# Patient Record
Sex: FEMALE | Race: BLACK OR AFRICAN AMERICAN | NOT HISPANIC OR LATINO | Employment: FULL TIME | URBAN - METROPOLITAN AREA
[De-identification: names, ages, dates, MRNs, and addresses within clinical notes are randomized per-mention and may not be internally consistent; named-entity substitution may affect disease eponyms.]

---

## 2017-03-08 ENCOUNTER — RECORDS - HEALTHEAST (OUTPATIENT)
Dept: LAB | Facility: CLINIC | Age: 22
End: 2017-03-08

## 2017-03-08 LAB — HIV 1+2 AB+HIV1 P24 AG SERPL QL IA: NEGATIVE

## 2019-08-18 ENCOUNTER — HOSPITAL ENCOUNTER (OUTPATIENT)
Dept: OBGYN | Facility: CLINIC | Age: 24
Discharge: HOME | End: 2019-08-18
Attending: OBSTETRICS & GYNECOLOGY | Admitting: FAMILY MEDICINE

## 2019-08-19 LAB — FETAL RBC % LFV: 0 %

## 2019-09-19 ENCOUNTER — HOSPITAL ENCOUNTER (OUTPATIENT)
Dept: MEDSURG UNIT | Facility: CLINIC | Age: 24
Discharge: HOME | End: 2019-09-19
Attending: FAMILY MEDICINE | Admitting: FAMILY MEDICINE

## 2019-09-19 ENCOUNTER — COMMUNICATION - HEALTHEAST (OUTPATIENT)
Dept: OBGYN | Facility: CLINIC | Age: 24
End: 2019-09-19

## 2019-09-19 DIAGNOSIS — N30.00 ACUTE CYSTITIS WITHOUT HEMATURIA: ICD-10-CM

## 2019-09-19 LAB
ABO/RH(D): NORMAL
ALBUMIN UR-MCNC: ABNORMAL MG/DL
AMPHETAMINES UR QL SCN: NORMAL
ANTIBODY SCREEN: NEGATIVE
APPEARANCE UR: ABNORMAL
BACTERIA #/AREA URNS HPF: ABNORMAL HPF
BARBITURATES UR QL: NORMAL
BENZODIAZ UR QL: NORMAL
BILIRUB UR QL STRIP: NEGATIVE
CANNABINOIDS UR QL SCN: NORMAL
CLUE CELLS: NORMAL
COCAINE UR QL: NORMAL
COLOR UR AUTO: YELLOW
CREAT UR-MCNC: 78.4 MG/DL
ERYTHROCYTE [DISTWIDTH] IN BLOOD BY AUTOMATED COUNT: 13.7 % (ref 11–14.5)
ETHANOL UR CFM-MCNC: <10 MG/DL
GLUCOSE UR STRIP-MCNC: NEGATIVE MG/DL
HBV SURFACE AG SERPL QL IA: NEGATIVE
HCT VFR BLD AUTO: 32.5 % (ref 35–47)
HGB BLD-MCNC: 10.4 G/DL (ref 12–16)
HGB UR QL STRIP: NEGATIVE
HIV 1+2 AB+HIV1 P24 AG SERPL QL IA: NEGATIVE
KETONES UR STRIP-MCNC: ABNORMAL MG/DL
LEUKOCYTE ESTERASE UR QL STRIP: ABNORMAL
MCH RBC QN AUTO: 27.1 PG (ref 27–34)
MCHC RBC AUTO-ENTMCNC: 32 G/DL (ref 32–36)
MCV RBC AUTO: 85 FL (ref 80–100)
METHADONE UR QL SCN: NORMAL
MUCOUS THREADS #/AREA URNS LPF: ABNORMAL LPF
NITRATE UR QL: POSITIVE
OPIATES UR QL SCN: NORMAL
OXYCODONE UR QL: NORMAL
PCP UR QL SCN: NORMAL
PH UR STRIP: 7 [PH] (ref 4.5–8)
PLATELET # BLD AUTO: 336 THOU/UL (ref 140–440)
PMV BLD AUTO: 9.7 FL (ref 8.5–12.5)
RBC # BLD AUTO: 3.84 MILL/UL (ref 3.8–5.4)
RBC #/AREA URNS AUTO: ABNORMAL HPF
SP GR UR STRIP: 1 (ref 1–1.03)
SQUAMOUS #/AREA URNS AUTO: ABNORMAL LPF
TRICHOMONAS, WET PREP: NORMAL
UROBILINOGEN UR STRIP-ACNC: ABNORMAL
WBC #/AREA URNS AUTO: ABNORMAL HPF
WBC: 7.9 THOU/UL (ref 4–11)
YEAST, WET PREP: NORMAL

## 2019-09-20 LAB
RUBV IGG SERPL QL IA: POSITIVE
T PALLIDUM AB SER QL: NEGATIVE

## 2019-09-22 LAB — BACTERIA SPEC CULT: ABNORMAL

## 2019-10-10 ENCOUNTER — TRANSFERRED RECORDS (OUTPATIENT)
Dept: HEALTH INFORMATION MANAGEMENT | Facility: CLINIC | Age: 24
End: 2019-10-10

## 2019-10-29 ENCOUNTER — TELEPHONE (OUTPATIENT)
Dept: FAMILY MEDICINE | Facility: CLINIC | Age: 24
End: 2019-10-29

## 2019-10-29 NOTE — TELEPHONE ENCOUNTER
[facility] phone call message- general phone call:    Reason for call: Patient is calling for OB care, she states she is being seen at the [facility] but wants to transfer over to us because she doesn't want to deliver at [facility] and that they kept messing up with her appts. She states she is 31 weeks pregnant. Told her it could take up to two business days for a response. Please call and advise.     Return call needed: Yes    OK to leave a message on voice mail?     Primary language: English      needed? No    Call taken on October 29, 2019 at 3:28 PM by Cheko Marie

## 2019-10-31 NOTE — TELEPHONE ENCOUNTER
Carolina is a 23yo, english speaking female who wishes to transfer care to [facility] r/t wanting to deliver at [facility] rather than [facility]. Patient endorses negative experience at [facility] r/t pre-eclampsia and hemorrhage. Carolina and father of baby, Romero, are excited for this pregnancy. Romero is not FOB to previous. Carolina endorses hemorrhage during  in 2016, previous delivery uncomplicated.     Carolina has been without insurance and has had most care at the hospital. Carolina was recently diagnosed with a  UTI and did not  her antibiotics, stated hospital never sent them to pharmacy. Requested Carolina call hospital and have them resent and to take immediately r/t symptomatic. Carolina verbalized understanding.     Patient  previous  but stated she stopped at 5 months due to low milk supply. Asked patient if she had tried arroyo expression, patient stated she hadn't. Patient endorses lack of education on lactation. Advised we can assist with education for her, patient appreciated and verbalized understanding. Patient wants to breastfeed  for one year.     Carolina is currently on medical leave, requesting  complete FMLA paperwork at visit. Carolina wishes to see a female provider and will see . TARI:2019 per patient. DWAINE to be signed at visit.     Average Risk Category  No significant risk factors: Yes    At Risk Category (up to 3)  Teen pregnancy: No  Poor social situation: No  Domestic abuse: No  Financial difficulties: No  Smoker: No  H/O  deliver: No  H/O drug abuse: No  Non-English speaking: No  Advanced maternal age: No  GDM risks: No  Previous C/S: No  H/O PIH: No  H/O STIs: No  H/O mental health concerns: No  Onset care > 20 weeks: Yes  Other: Hx of hypertension during pregnancy, pre eclampsia    High Risk Category (4 or more At Risk or)  Diabetes/GDM: No  Multiple gestation: No  Chronic hypertension: Yes  Significant hx of asthma: No  Fetal  demise > 20 weeks: No  Positive tox screen: No  Current mental health treatment: No  Other: pre eclampsia during previous delivery    Risk: High Risk   Date determined: 10/31/2019    Tiny JOHNSON

## 2019-11-03 NOTE — PROGRESS NOTES
Carolina Mcpherson is a 24 year old  female who presents to clinic for a new OB visit.  Her last menstrual period was 3/14/19, estimate     Estimated Date of Delivery: TARI of 19 based on US at 5w, consistent with LMP    She has not had bleeding since her LMP. She has not had any abdominal pain or cramping since her LMP. She has not had nausea. Weigh loss has not occurred.     OTHER CONCERNS:    - Was seen at [facility] due to insurance issues prior to presenting to our clinic   - Wants to transfer care as she does not want to delivery at [facility]   - Was seen at [facility] maternity triage on 10/21 due to contractions and dysuria. Infant status reassuring, fetal fibronectin negative. Gonorrhea and chlamydia negative. UC positive for >100,000 E coli pan sensitive. Still has not picked up antibiotic for treatment, states it was not at the pharmacy. Continues to have contractions every 10 minutes, is able to breathe through them. No fevers, chills, dysuria, but is having frequency and urgency. Continues to feel baby move and denies any bleeding or vaginal discharge   - Has been seen at numerous hospitals at [facility] monthly for triages since the onset of her pregnancy.   - History of  in 2016 with postpartum hemorrhage and preeclampsia, reports possibility of gestational hypertension, but does not remember if she was on any medications   - First pregnancy with  delivery around 35-36w, no other issues   - Reports her pre-pregnancy weight of 190lbs, has gained 37lbs thus far in pregnancy     Pre Term Labor Risk Assessment  Is the patient's age <18 or >40? No  Patint's BMI is Body mass index is 37.77 kg/m .. Does patient have a BMI < 18.5? No  If previous pregnancy, was delivery within previous 6 months? No  Have you ever delivered a baby prior to 37 weeks gestation? No  Did conception for this pregnancy occur via In Vitro Fertilization? No  Summary: Patient is not high risk for  Labor for   labor.    Patient Active Problem List   Diagnosis   (none) - all problems resolved or deleted       OB History    Para Term  AB Living   3 2 1 1 0 2   SAB TAB Ectopic Multiple Live Births   0 0 0 0 2      # Outcome Date GA Lbr Ghulam/2nd Weight Sex Delivery Anes PTL Lv   3 Current            2 Term 16      None  MICHELE      Complications: Hemorrhage, Preeclampsia/Hypertension   1  14   3.515 kg (7 lb 12 oz) M  EPI N MICHELE     History of preeclampsia and post-partum hemorrhage with last pregnancy. Possible history of gestational hypertension, though unclear     No past surgical history     Prenatal vitamin      Do you have a history of any of the following medical conditions?  Condition Yes/No   Hypertension YES   Heart disease, mitral valve prolapse, rheumatic fever No   Asthma or another chronic lung disease No   An autoimmune disorder No   Kidney disease No   Frequent urinary tract infections No   Migraine headaches No   Stroke, loss of sensation/function, seizures, or other neuro problem No   Diabetes No   Thyroid problems or have you taken thyroid medication No   Hepatitis, liver disease, jaundice No   Blood clots, phlebitis, pulmonary embolism or varicose veins No   Excessive bleeding after surgery or dental work No   Heavy menstrual periods requiring treatment No   Anemia No   Blood transfusions No        Would you refuse a blood transfusion? No   Breast problems No   Abnormalities of the uterus No   Abnormal pap smear No   Have you been treated for an emotional disturbance? No   Have you been physically, sexually, or emotionally hurt by someone? No   Have you been in a major accident or suffered serious trauma? No   Have you had surgical procedures? No        Have you ever had any complications from anesthesia? No   Have you ever been hospitalized for a nonsurgical reason? No     Notes for positives:     History of possible gestational hypertension with last pregnancy,  though history unclear     Prenatal Genetic Screening  Do you have a history of any of the following Yes/No        A metabolic disorder (e.g. Insulin-dependent DM, PKU) No        Recurrent pregnancy loss No     Do you, the baby's father, or anyone in your families have Yes/No        Thalassemia AND MCV <80 No        Hemophilia No        Neural tube defect No        Congenital heart defect No        Sickle cell disease or trait No        Muscular dystrophy No        Cystic fibrosis No        Mental retardation or autism No        Down's syndrome No        Howie-Sach's disease No        Yakima's chorea No        Any other inherited genetic or chromosomal disorder No        A child with birth defects not listed above No     Infection History  At high risk for coming in contact with HIV No   Ever treated for tuberculosis No   Ever received the BCG vaccine for tuberculosis No   Ever had genital herpes (or has your partner) No   Had a rash or viral illness since LMP No   Ever had a sexually transmitted infection No   Ever had chicken pox or the vaccine Yes   Ever had any other serious infectious disease No   Up to date with immunizations Yes   STI History YES - chlamydia, most recent on 10/21 negative        PERSONAL/SOCIAL HISTORY  Social History     Tobacco Use     Smoking status: Never Smoker     Smokeless tobacco: Never Used   Substance Use Topics     Alcohol use: Never     Frequency: Never     Drug use: None      Have you used any tobacco products, alcohol or any other drugs during this pregnancy before or after your knew you were pregnant? No    REVIEW OF SYSTEMS  C: NEGATIVE for fever, chills  E: NEGATIVE for vision changes or irritation  ENT: NEGATIVE for ear, mouth and throat problems  R: NEGATIVE for significant cough or SOB  B: NEGATIVE for masses, tenderness or discharge  CV: NEGATIVE for chest pain, palpitations or peripheral edema  GI: NEGATIVE for nausea, abdominal pain, heartburn, or change in bowel  "habits  : NEGATIVE for unusual urinary or vaginal symptoms.   M: NEGATIVE for significant arthralgias or myalgia  N: NEGATIVE for weakness, dizziness or paresthesias  P: NEGATIVE for changes in mood or affect    PHYSICAL EXAM:  /81   Pulse 107   Temp 97.6  F (36.4  C) (Oral)   Resp 18   Ht 1.651 m (5' 5\")   Wt 103 kg (227 lb)   LMP 03/14/2019 (Approximate)   SpO2 99%   BMI 37.77 kg/m     GENERAL:  Pleasant pregnant female, obese, alert, well groomed.  SKIN:  Warm and dry, without lesions or rashes  EYES:  PERRLA, EOM intact  NECK:  Thyroid without enlargement and nodules.   LUNGS:  Clear to auscultation.  HEART:  RRR without murmur.  ABDOMEN: Soft without masses, tenderness or organomegaly. No CVA tenderness. Fundus at 37cm. FHT 145bpm  MUSCULOSKELETAL:  Full range of motion. Normal gait   EXTREMITIES:  No edema. No significant varicosities.   GENITALIA:  Normal appearing anatomy, no lesions noted.  CERVIX: 0.5/10/-4 with no abnormal vaginal discharge     ASSESSMENT/PLAN  Patient Active Problem List   Diagnosis     History of postpartum hemorrhage, currently pregnant in third trimester     History of pre-eclampsia in prior pregnancy, currently pregnant in third trimester     Excess weight gain in pregnancy, third trimester     Supervision of high risk pregnancy in third trimester     - Patient has had some prenatal labs through recent OB triage visits at various hospitals, and likely more through [facility]. Reports she had her 1 hour GTT with normal results. Has gotten her TDAP. Denies any US after dating US   - Will obtain records from [facility] and see what additional labs we need to catch her up on prenatal care   - BP slightly above normal, but will continue close monitoring   - Fetal anatomy US ordered   - Augmentin 5 day course sent for treatment of UTI, despite contractions every 10 minutes for 2 weeks cervix is still closed, high and posterior. Recommended she  the " medication tonight. Discussed indications to present to [facility] [place] and number given   - Would like genetic screening, which at this point would be cell free DNA, discussed risk of false positives, no specific risk factors for genetic abnormalities. Referral placed for OB to have this done   - Discussed excessive weight gain thus far in pregnancy and need to not overeat, ideally would maintain this weight until delivery, likely why she is measuring large for gestation, but will obtain fetal US to evaluate this as well   - Influenza vaccine given today   -Safe medications during pregnancy. Is currently taking prenatal vitamin daily.   -Healthy habits including not using tobacco or alcohol, exercising regularly and maintaining healthy diet  -Recommendations for breastfeeding.    Will follow up in 1 week for routine pre- care.    Bhavya Bravo DO (PGY3)  [facility] Resident  Pager: [phone]      Precepted with: Luz Maria Fuentes MD

## 2019-11-04 ENCOUNTER — OFFICE VISIT (OUTPATIENT)
Dept: FAMILY MEDICINE | Facility: CLINIC | Age: 24
End: 2019-11-04
Payer: MEDICAID

## 2019-11-04 VITALS
TEMPERATURE: 97.6 F | DIASTOLIC BLOOD PRESSURE: 81 MMHG | RESPIRATION RATE: 18 BRPM | WEIGHT: 227 LBS | BODY MASS INDEX: 37.82 KG/M2 | HEIGHT: 65 IN | OXYGEN SATURATION: 99 % | HEART RATE: 107 BPM | SYSTOLIC BLOOD PRESSURE: 134 MMHG

## 2019-11-04 DIAGNOSIS — O26.03 EXCESS WEIGHT GAIN IN PREGNANCY, THIRD TRIMESTER: ICD-10-CM

## 2019-11-04 DIAGNOSIS — Z23 NEED FOR PROPHYLACTIC VACCINATION AND INOCULATION AGAINST INFLUENZA: ICD-10-CM

## 2019-11-04 DIAGNOSIS — O23.43 URINARY TRACT INFECTION IN MOTHER DURING THIRD TRIMESTER OF PREGNANCY: Primary | ICD-10-CM

## 2019-11-04 DIAGNOSIS — O09.293 HISTORY OF POSTPARTUM HEMORRHAGE, CURRENTLY PREGNANT IN THIRD TRIMESTER: ICD-10-CM

## 2019-11-04 DIAGNOSIS — O09.293 HISTORY OF PRE-ECLAMPSIA IN PRIOR PREGNANCY, CURRENTLY PREGNANT IN THIRD TRIMESTER: ICD-10-CM

## 2019-11-04 DIAGNOSIS — O09.93 SUPERVISION OF HIGH RISK PREGNANCY IN THIRD TRIMESTER: ICD-10-CM

## 2019-11-04 RX ORDER — PRENATAL VIT/IRON FUM/FOLIC AC 27MG-0.8MG
1 TABLET ORAL DAILY
COMMUNITY

## 2019-11-04 SDOH — HEALTH STABILITY: MENTAL HEALTH: HOW OFTEN DO YOU HAVE A DRINK CONTAINING ALCOHOL?: NEVER

## 2019-11-04 ASSESSMENT — MIFFLIN-ST. JEOR: SCORE: 1780.55

## 2019-11-04 NOTE — PATIENT INSTRUCTIONS
[facility] Information  Your resident OB Doctor is Dr. Bhavya Bravo.    If you have any further concerns or wish to schedule another appointment, please call our office at [phone] during normal business hours (8-5, M-F).     For uncomplicated pregnancies, you will be seeing your doctor once a month until you are 28 weeks, then you will see your doctor twice a month. You will begin weekly visits at 36 weeks until you deliver.     If you have urgent medical questions that cannot wait, you may call [phone] at any time of day.     If you have a medical emergency, please call [phone].     When to call or come in to [facility] ([phone] for Labor & Delivery unit)  If you notice a decrease in your baby's movement.   If your begin to experience contractions that are 5 minutes or less apart and lasting for over 45 seconds, or if contractions are becoming more painful.  If you have any bleeding or leakage of fluids.   If you have a headache not resolved with tylenol, right upper abdominal pain, or sudden onset of swelling.  You know your body best. Never hesitate to call or go to the hospital if something doesn't feel right!    After-baby Birth Control Methods   It is important to consider contraception after your baby is born if you would like to prevent a pregnancy in the near future. If you are breast feeding, talk with your doctor to determine the best method for you. It is recommended that you wait 12 months after the birth of your baby to get pregnant again.   Natural Family Planning  It is possible to avoid pregnancy or time them based on your family goals without any hormones or medications or need for condoms. Talk with Dr. Gomez about the many options that are available to track your menstrual cycle and identify days which you are fertile and not fertile. The Sympto-thermal method is as effective as birth control pills.  Condoms   Latex condoms can prevent pregnancy and STDs. Condoms  work best when used with spermicide that is placed inside the vagina as well as inside the condom. Use only water-based lubricants. Petroleum based products (such as Vaseline and many massage oils) can weaken the latex and cause it to break.   IUD   IUD's are made of flexible plastic and must be inserted into your uterus by a doctor. The IUD works by stopping the fertilized egg from attaching itself to the uterus. IUDs may increase the risk of getting a pelvic infection (PID), which can lead to infertility if not diagnosed and treated early. This is a great option after delivery of your baby! These last for 3, 5, or 10 years depending up on which type you choose, but can be removed earlier if you decide you would like to get pregnant sooner.    Tubal Ligation & Vasectomy   These are good choices for women and men who know that they do not want to have any more children.     HORMONES   Birth control pills, hormone implants and shots work by stopping ovulation (release of the egg from the ovary). Implants are placed in the upper arm by a minor surgical incision. They last for five years, but can be removed by your doctor if you decide to get pregnant. Hormone injections must be repeated every three months. The Pill must be taken every day.   All hormones can have side effects and create certain health risks. They are very effective in preventing pregnancy but they do not prevent STDs. You can talk more about the risks and benefits with your doctor.     Controlling Back Pain  As your body changes during pregnancy, your back must work in new ways. Back pain is due to many causes. Physical changes in your body can strain your back and its supporting muscles. Also, hormones (chemicals that carry messages throughout the body) increase during pregnancy. This can affect how the muscles and joints work together. All of these changes can lead to pain in the upper or lower back or pelvis. Some pregnant women have sciatica, pain  caused by pressure on the sciatic nerve running down the back of the leg. Ask your healthcare provider for specific tips and exercises to help control your back pain.    Tips to Help You Rest  Good rest and sleep will help you feel better. Here are some ideas:  Ask your partner to massage your shoulders, neck, or back.  Limit the errands you do each day.  Lie down in the afternoon or after work for a few minutes.  Take a warm bath before you go to sleep.  Drink warm milk or teas without caffeine.  Avoid coffee, black tea, and cola.    Preventing Heartburn  Avoid spicy or acidic foods.   Eat small amounts more often.  Wait 2 hours after eating before lying down   Sleep with your upper body raised 6 inches.  May use Tums as needed. Talk to your doctor about other medications to try.     Referral for :     OB/GYN   LOCATION/PLACE/Provider :    [facility]   DATE & TIME :    Faxed referral, location will call   PHONE :     [phone]  FAX :    [phone]  Appointment made by clinic staff/:    Delilah

## 2019-11-05 ENCOUNTER — NURSE TRIAGE (OUTPATIENT)
Dept: NURSING | Facility: CLINIC | Age: 24
End: 2019-11-05

## 2019-11-05 ENCOUNTER — COMMUNICATION - HEALTHEAST (OUTPATIENT)
Dept: SCHEDULING | Facility: CLINIC | Age: 24
End: 2019-11-05

## 2019-11-06 ENCOUNTER — HOSPITAL ENCOUNTER (OUTPATIENT)
Dept: OBGYN | Facility: HOSPITAL | Age: 24
Discharge: HOME | End: 2019-11-07
Attending: FAMILY MEDICINE | Admitting: FAMILY MEDICINE

## 2019-11-06 LAB
ALBUMIN UR-MCNC: ABNORMAL MG/DL
APPEARANCE UR: CLEAR
BACTERIA #/AREA URNS HPF: ABNORMAL HPF
BILIRUB UR QL STRIP: NEGATIVE
CLUE CELLS: NORMAL
COLOR UR AUTO: YELLOW
GLUCOSE UR STRIP-MCNC: NEGATIVE MG/DL
HGB UR QL STRIP: NEGATIVE
KETONES UR STRIP-MCNC: ABNORMAL MG/DL
LEUKOCYTE ESTERASE UR QL STRIP: ABNORMAL
MUCOUS THREADS #/AREA URNS LPF: ABNORMAL LPF
NITRATE UR QL: NEGATIVE
PH UR STRIP: 6 [PH] (ref 4.5–8)
RBC #/AREA URNS AUTO: ABNORMAL HPF
SP GR UR STRIP: 1.02 (ref 1–1.03)
SQUAMOUS #/AREA URNS AUTO: ABNORMAL LPF
TRICHOMONAS, WET PREP: NORMAL
UROBILINOGEN UR STRIP-ACNC: ABNORMAL
WBC #/AREA URNS AUTO: ABNORMAL HPF
YEAST, WET PREP: NORMAL

## 2019-11-06 ASSESSMENT — MIFFLIN-ST. JEOR: SCORE: 1770.55

## 2019-11-06 NOTE — TELEPHONE ENCOUNTER
"\"I am 32 weeks(3rd pregnancy) and since yesterday  I have been having contractions 10 minutes apart. I know the difference between [place] and labor. These are not going away. Tonight I am also have vaginal pressure and upper leg pain and back labor.\" Baby is active. Denies leakage of fluids or other sx. Triaged and advised ER.  Ginny Arreaga RN [facility]        Reason for Disposition    Contractions < 10 minutes apart for 1 hour (i.e., 6 or more contractions an hour)    Additional Information    Negative: Passed out (i.e., lost consciousness, collapsed and was not responding)    Negative: Shock suspected (e.g., cold/pale/clammy skin, too weak to stand, low BP, rapid pulse)    Negative: Difficult to awaken or acting confused (e.g., disoriented, slurred speech)    Negative: [1] SEVERE abdominal pain (e.g., excruciating) AND [2] constant AND [3] present > 1 hour    Negative: Severe bleeding (e.g., continuous red blood from vagina, or large blood clots)    Negative: Umbilical cord hanging out of the vagina (shiny, white, curled appearance, \"like telephone cord\")    Negative: Uncontrollable urge to push (i.e., feels like baby is coming out now)    Negative: Can see baby    Negative: Sounds like a life-threatening emergency to the triager    Negative: MODERATE-SEVERE abdominal pain    Protocols used: PREGNANCY - LABOR - [id number]      "

## 2019-11-07 DIAGNOSIS — O09.93 SUPERVISION OF HIGH RISK PREGNANCY IN THIRD TRIMESTER: ICD-10-CM

## 2019-11-07 LAB
AMPHETAMINES UR QL SCN: NORMAL
BARBITURATES UR QL: NORMAL
BENZODIAZ UR QL: NORMAL
CANNABINOIDS UR QL SCN: NORMAL
COCAINE UR QL: NORMAL
CREAT UR-MCNC: 141.6 MG/DL
OPIATES UR QL SCN: NORMAL
OXYCODONE UR QL: NORMAL
PCP UR QL SCN: NORMAL

## 2019-11-07 NOTE — PROGRESS NOTES
Preceptor Attestation:  Patient's case reviewed and discussed with  Patient seen and discussed with the resident..  I agree with written assessment and plan of care.  Supervising Physician:  Gina Fuentes MD  [facility]

## 2019-11-07 NOTE — LETTER
November 8, 2019      Carolina Mcpherson  [address] [facility] [address]        Dear Carolina,    It was a pleasure to see you at clinic. The results of your ultrasound are normal. Baby is growing at a normal rate.     Please call our clinic with any questions. We look forward to seeing you again.         If you have any questions, please call the clinic to make an appointment.    Sincerely,    Dr. Bravo

## 2019-11-08 LAB
ALLERGIC TO PENICILLIN: NO
BACTERIA SPEC CULT: NO GROWTH
GP B STREP DNA SPEC QL NAA+PROBE: NEGATIVE

## 2019-11-10 NOTE — PROGRESS NOTES
"Carolina Mcpherson is a 24 year old  female who presents to clinic for a follow up OB visit. She is currently 32w6d with an estimated date of delivery of Dec 30, 2019 via LMP, consistent with US at 5w. She denies headache, chest pain, shortness of breath, abdominal pain/contractions, vaginal bleeding, or abnormal discharge. She has felt baby move.     New concerns today:   Genetic screening- still waiting on results    Seen at triage for contractions. Cervix unchanged since clinic or during 2 hour observation. Was given 1L bolus and recommended to finish course of antibiotics.   Contractions last night which only lasted a minute    On review of records from prior pregnancies:   - With last pregnancy on  patient had no prenatal care, presented in active labor at 40w1d. . She did have intrapartum elevated BPs, but no preeclampsia and no postpartum hemorrhage.   - With other prior pregnancy delivered at 37w2d. No prenatal complications, though was late to prenatal care     US normal with normal growth, but difficult to assess cisterna magna given gestation age. EFW at 40th percentile based on LMP      OB History    Para Term  AB Living   3 2 1 1 0 2   SAB TAB Ectopic Multiple Live Births   0 0 0 0 2      # Outcome Date GA Lbr Ghulam/2nd Weight Sex Delivery Anes PTL Lv   3 Current            2 Term 16      None  MICHELE      Complications: Hemorrhage, Preeclampsia/Hypertension   1  14   3.515 kg (7 lb 12 oz) M  EPI N MICHELE     Physical exam:  BP 99/68   Pulse 102   Temp 97.7  F (36.5  C) (Oral)   Resp 16   Ht 1.662 m (5' 5.43\")   Wt 103.6 kg (228 lb 6.4 oz)   LMP 2019 (Approximate)   SpO2 96%   BMI 37.51 kg/m      General: alert, female in no acute distress  Abdomen: gravid uterus appropriate for gestation age at 37 cm above pubic symphysis, non-tender, FHTs 135  Extremities: no edema    Assessment/Plan:  Patient Active Problem List   Diagnosis     History of " postpartum hemorrhage, currently pregnant in third trimester     History of pre-eclampsia in prior pregnancy, currently pregnant in third trimester     Excess weight gain in pregnancy, third trimester     Supervision of high risk pregnancy in third trimester     Prenatal complications   - Excessive weight gain in pregnancy     Patient plans to breastfeed.   Post-partum contraception plans: unsure, but she does not want any more children, tried depo and nexplanon and did not like these,  would never get a vasectomy   Reviewed  care and baby-friendly practices done at hospital after delivery.  Baby BOY - would like circumcision     Follow up in 2 weeks for routine prenatal visit.     Bhavya Bravo DO (PGY3)  [facility] Resident  Pager: [phone]      Precepted with: Julio Mendez MD

## 2019-11-11 ENCOUNTER — OFFICE VISIT (OUTPATIENT)
Dept: FAMILY MEDICINE | Facility: CLINIC | Age: 24
End: 2019-11-11
Payer: MEDICAID

## 2019-11-11 ENCOUNTER — DOCUMENTATION ONLY (OUTPATIENT)
Dept: FAMILY MEDICINE | Facility: CLINIC | Age: 24
End: 2019-11-11

## 2019-11-11 VITALS
HEIGHT: 65 IN | RESPIRATION RATE: 16 BRPM | HEART RATE: 102 BPM | BODY MASS INDEX: 38.05 KG/M2 | OXYGEN SATURATION: 96 % | TEMPERATURE: 97.7 F | WEIGHT: 228.4 LBS | DIASTOLIC BLOOD PRESSURE: 68 MMHG | SYSTOLIC BLOOD PRESSURE: 99 MMHG

## 2019-11-11 DIAGNOSIS — O26.03 EXCESS WEIGHT GAIN IN PREGNANCY, THIRD TRIMESTER: ICD-10-CM

## 2019-11-11 DIAGNOSIS — Z34.83 ENCOUNTER FOR SUPERVISION OF OTHER NORMAL PREGNANCY, THIRD TRIMESTER: Primary | ICD-10-CM

## 2019-11-11 ASSESSMENT — MIFFLIN-ST. JEOR: SCORE: 1793.77

## 2019-11-11 NOTE — LETTER
RETURN TO WORK/SCHOOL FORM    11/11/2019    Re: Carolina Mcpherson  1995      To Whom It May Concern:     Carolina Mcpherson was seen in clinic today.  She may return to work with restrictions on 11/12/19          Restrictions: Limited to light duty - lifting no greater than 30 pounds          Bhavya Bravo,   11/11/2019 2:50 PM

## 2019-11-11 NOTE — PROGRESS NOTES
Faxed DWAINE to [facility] at [phone] after verifying with medical records patient was seen there and verified fax number. Tiny JOHNSON

## 2019-11-18 ENCOUNTER — TELEPHONE (OUTPATIENT)
Dept: FAMILY MEDICINE | Facility: CLINIC | Age: 24
End: 2019-11-18

## 2019-11-18 PROBLEM — O60.00 PRETERM LABOR WITHOUT DELIVERY: Status: ACTIVE | Noted: 2019-11-18

## 2019-11-18 PROBLEM — Z34.83 ENCOUNTER FOR SUPERVISION OF OTHER NORMAL PREGNANCY, THIRD TRIMESTER: Status: ACTIVE | Noted: 2019-11-04

## 2019-11-18 NOTE — TELEPHONE ENCOUNTER
Called patient to schedule HFU with colored team for this week. No answer, left VM to call clinic back. Tiny JOHNSON

## 2019-11-19 ENCOUNTER — TELEPHONE (OUTPATIENT)
Dept: FAMILY MEDICINE | Facility: CLINIC | Age: 24
End: 2019-11-19

## 2019-11-19 NOTE — TELEPHONE ENCOUNTER
Seen in Maternity 2019 for  labor, given Magnesium. Had experienced diarrhea while in hospital, was informed side effect from medication. Continues to have diarrhea, average twice a day since , 2019. C/o weakness, dizziness and experiencing an increase in contractions. Good fetal movement. Has been trying to push a good amount of fluid intake. With  history and continued worsening contractions, advised to go back into maternity for further assessment and intervention, only 34w 1d gestation. Tanya JOHNSON

## 2019-11-19 NOTE — PROGRESS NOTES
Preceptor Attestation:   Patient seen, evaluated and discussed with the resident. I have verified the content of the note, which accurately reflects my assessment of the patient and the plan of care.    Supervising Physician:Julio Mendez MD    [facility]

## 2019-11-21 ENCOUNTER — OFFICE VISIT (OUTPATIENT)
Dept: FAMILY MEDICINE | Facility: CLINIC | Age: 24
End: 2019-11-21
Payer: MEDICAID

## 2019-11-21 VITALS
HEART RATE: 111 BPM | WEIGHT: 232 LBS | TEMPERATURE: 98.3 F | DIASTOLIC BLOOD PRESSURE: 68 MMHG | OXYGEN SATURATION: 98 % | RESPIRATION RATE: 20 BRPM | SYSTOLIC BLOOD PRESSURE: 103 MMHG | BODY MASS INDEX: 38.1 KG/M2

## 2019-11-21 DIAGNOSIS — Z34.83 ENCOUNTER FOR SUPERVISION OF OTHER NORMAL PREGNANCY, THIRD TRIMESTER: Primary | ICD-10-CM

## 2019-11-21 NOTE — PROGRESS NOTES
History   Hospital follow up: Per chart review, was admitted at [facility] 19 for 3 days. Notes reviewed. Was having  labor with contractions every 3-5m but no change in cervix. This stopped so was stable for discharge.     Then seen again in triage  by Dr. Bravo.     Then again last night at [place] triage.     Since her discharge she has been very anxious. Has had intermittent diarrhea. Plans to deliver at [facility].     Carolina Mcpherson is a 24 year old  female who presents to clinic for a follow up OB visit.   - 34w3d with TARI Dec 30, 2019  - No headache, chest pain, shortness of breath, vaginal bleeding, or abnormal discharge. has been feeling baby move every day.   - Circumcision plan: if boy, DOES desire circumcision  - Breastfeeding plan: prior breastfeeding experience and DOES plan to breastfeed  - Contraception plan: nexplanon at 6 weeks  - Birth plan: no pain meds  - Prior delivery complications: Maybe HTN in second pregnancy?  - Carseat: has carseat less than 5 years old  - Pediatrician plans: this clinic, with Dr. Bravo.     Was just seen at [place] overnight  She went there because she thought it was closer  She had some diarrhea   Cervical exam at that time was /3      Medical and social history and medications reviewed with patient.   Exam   /68   Pulse 111   Temp 98.3  F (36.8  C) (Oral)   Resp 20   Wt 105.2 kg (232 lb)   LMP 2019 (Approximate)   SpO2 98%   BMI 38.10 kg/m    General: NAD  Abdomen: gravid uterus appropriate for gestation age at 34 cm above pubic symphysis, non-tender, FHTs 130s  Extremities: no edema  Neuro: reflexes normal  Medical Decision-Making     Patient Active Problem List   Diagnosis     Excess weight gain in pregnancy, third trimester     Encounter for supervision of other normal pregnancy, third trimester      labor without delivery       Follow up: 19 (4d) for routine prenatal visit  Readdress: Anxiety    Walter  PEDRO Owens MD   [facility]  Pager #: [phone]      Precepted with Dr. Tolentino    Options for treatment and follow-up care were reviewed with the patient and/or guardian. Carolina FARNSWORTH Mcpherson and/or guardian engaged in the decision making process and verbalized understanding of the options discussed and agreed with the final plan.

## 2019-11-21 NOTE — Clinical Note
ARMANI Latif, follow up w/ you in 4d. Very anxious. Was at [facility] early this AM. Tried to reassure her. Note looks weird franki I thought routine OB but it was actually a TCM. JVM

## 2019-11-24 NOTE — PROGRESS NOTES
"Carolina Mcpherson is a 24 year old  female who presents to clinic for a follow up OB visit. She is currently 34w6d with an estimated date of delivery of Dec 30, 2019 via via LMP, consistent with US at 5w. She denies headache, chest pain, shortness of breath, abdominal pain, vaginal bleeding, or abnormal discharge. She has felt baby move.     New concerns today:   Contractions: Not as bad and not as frequent as before, every 20 min, lasting 1 minute. Mostly happen at night, not during the day.   Genetic screening results: No concerns   Weight: Same from last visit. Congratulated on weight maintenance and encouraged to continue     OB History    Para Term  AB Living   3 2 2 0 0 2   SAB TAB Ectopic Multiple Live Births   0 0 0 0 2      # Outcome Date GA Lbr Ghulam/2nd Weight Sex Delivery Anes PTL Lv   3 Current            2 Term 16 40w1d     None  MICHELE   1 Term 14 37w2d  3.515 kg (7 lb 12 oz) M  EPI N MICHELE      Obstetric Comments   Late to prenatal care with first pregnancy. No prenatal care with second pregnancy        Physical exam:  BP 93/64   Pulse 88   Temp 97.3  F (36.3  C)   Resp 22   Ht 1.651 m (5' 5\")   Wt 105.3 kg (232 lb 3.2 oz)   LMP 2019 (Approximate)   SpO2 99%   BMI 38.64 kg/m      General: alert, female in no acute distress  Abdomen: gravid uterus appropriate for gestation age at 37 cm above pubic symphysis, non-tender, FHTs 130  Extremities: no edema    Assessment/Plan:  Patient Active Problem List   Diagnosis     Excess weight gain in pregnancy, third trimester     Encounter for supervision of other normal pregnancy, third trimester      labor without delivery     Prenatal complications   - Late to prenatal care   - Excessive weight gain in pregnancy   -  contractions  requiring terb and magnesium, but no cervical change. Did receive betamethasone x2     Continued to encourage patient for weight maintenance during pregnancy (weight " gain of 42lbs thus far in pregnancy)   Patient plans to breastfeed.   Post-partum contraception plans: Nexplanon  Reviewed potential interventions during labor including amniotomy, operative vaginal delivery and operative  delivery.     Baby BOY - would like circumcision      Follow up on Friday for prenatal care    Bhavya Bravo DO (PGY3)  [facility] Resident  Pager: [phone]      Precepted with: Luz Maria Fuentes MD

## 2019-11-25 ENCOUNTER — OFFICE VISIT (OUTPATIENT)
Dept: FAMILY MEDICINE | Facility: CLINIC | Age: 24
End: 2019-11-25
Payer: MEDICAID

## 2019-11-25 VITALS
OXYGEN SATURATION: 99 % | WEIGHT: 232.2 LBS | HEART RATE: 88 BPM | DIASTOLIC BLOOD PRESSURE: 64 MMHG | HEIGHT: 65 IN | TEMPERATURE: 97.3 F | RESPIRATION RATE: 22 BRPM | SYSTOLIC BLOOD PRESSURE: 93 MMHG | BODY MASS INDEX: 38.69 KG/M2

## 2019-11-25 DIAGNOSIS — O26.03 EXCESS WEIGHT GAIN IN PREGNANCY, THIRD TRIMESTER: ICD-10-CM

## 2019-11-25 DIAGNOSIS — Z34.83 ENCOUNTER FOR SUPERVISION OF OTHER NORMAL PREGNANCY, THIRD TRIMESTER: Primary | ICD-10-CM

## 2019-11-25 ASSESSMENT — MIFFLIN-ST. JEOR: SCORE: 1804.13

## 2019-11-25 NOTE — PATIENT INSTRUCTIONS
[facility] Information  Your resident OB Doctor is Dr. Bhavya Bravo.    If you have any further concerns or wish to schedule another appointment, please call our office at [phone] during normal business hours (8-5, M-F).     For uncomplicated pregnancies, You will begin weekly visits at 36 weeks until you deliver.     If you have urgent medical questions that cannot wait, you may call [phone] at any time of day.     If you have a medical emergency, please call 911.     When to call or come in to [facility] ([phone] for Labor & Delivery unit)  If you notice a decrease in your baby's movement.   If your begin to experience contractions that are 5 minutes or less apart and lasting for over 45 seconds, or if contractions are becoming more painful.  If you have any bleeding or leakage of fluids.   If you have a headache not resolved with tylenol, right upper abdominal pain, or sudden onset of swelling.  You know your body best. Never hesitate to call or go to the hospital if something doesn't feel right!    Preparing for the hospital  You re likely feeling anxious as your child s birth approaches. This is normal. To give yourself some peace of mind, pack a bag 3-4 weeks before your due date. Here is a list of things to remember:  Personal care items like toothbrush, hair brush, lip balm, lotion, shampoo, glasses, contacts  Nightgown, bathrobe, slippers  Several pairs of underwear  Comfortable clothes for you to wear home  Camera with new batteries  Cell phone and   Insurance information and any other paperwork needed for your hospital stay  Clothes for baby to wear home  An infant, rear-facing car seat for bringing home your baby (this is required by law)    What Is Group B Strep?   Group B strep (streptococcus) is a common bacteria. It can grow in a woman s vagina, rectum, or urinary tract. It is almost always harmless in adults. But in rare cases, a woman who has group B strep  can infect her baby during the birth. Infection can cause serious illness in the . The good news is that treating the mother during labor reduces the risk of the baby becoming infected. And if a  is infected, the infection can be treated. You will be screened for Group B strep at 35-36 weeks gestation.    Facts about group B strep   Learning more about group B strep can help you understand how testing and treatment can help. Here are some basic facts about group B strep:   It is not a sexually transmitted disease.   It is not the same as strep throat. (This is caused by group A strep.)   It often has no symptoms and may cause no problems in adults.   Group B strep can be transmitted during vaginal delivery. It cannot be passed during  (surgical) birth.   A mother with group B strep rarely infects her . (Infection occurs only about 1% to 2% of the time.)   When a mother is treated during labor and delivery, her baby almost never becomes infected.   Certain factors during pregnancy increase the risk of a baby becoming infected.    Possible effects on your baby   Group B strep can infect the blood. It can also cause inflammation of the baby s lungs, brain, or spinal cord. Long-term effects can include blindness, deafness, mental retardation, or cerebral palsy. And in rare cases, infection causes death. Infection is most often detected soon after the baby is born.     How your baby may become infected   Group B strep often lives in the vagina or rectum. If the amniotic sac breaks early, bacteria from the vagina can travel to the uterus, reaching the baby. Or, as the baby passes through the birth canal, it can come in contact with the bacteria. In rare cases, group B strep can also be passed to the baby after delivery. This is called late-onset group B strep. The source of this type of infection is not well understood. But some experts believe that it happens if the baby is exposed to group B  strep in the home, from the parents or siblings, or in the community.     What increases the risk?   Certain risk factors increase the chance that a baby will be infected. They include:   Breaking or leaking of the amniotic sac earlier than 37 weeks gestation   Labor earlier than 37 weeks gestation   Breaking of the amniotic sac more than 18 hours before labor begins   Fever during labor   A urinary tract infection with group B strep at any point in the pregnancy   A previous baby born with a group B strep infection    BaBaby Feeding in the Hospital: Information, Support and      Resources    As you prepare for the birth of your child, you will want to consider options for feeding your baby cluding breast-feeding and/or baby formula. The American Academy of Pediatrics recommends exclusive breast-feeding for the first six months (although any amount of breast-feeding is beneficial).  However, we also understand that breast-feeding is  a personal choice and not for everyone. Whether or not you choose to breast-feed, your decision will be respected by our staff.        There are numerous benefits of breast-feeding; here are a few to consider:    Provides antibodies to protect your baby from infections and diseases    The cost: formula can cost over $1,500 per year    Convenience, no warming up or sterilizing bottles and supplies    The physical contact with breastfeeding can make babies feel secure, warm and comforted    Whatever my feeding choice, what can I expect after I deliver my baby?    Your baby will usually be placed skin-to-skin immediately following birth. The skin to skin contact between you and your baby will be a special and memorable time. The bonding and attachment comforts your baby and has a positive effect on baby s brain development.     Having your baby  room in  with you also helps you start to learn your baby s body rhythms and sleep cycle.      You will also begin to learn your baby s cues  (signals) that he or she is ready to feed.    When do I start to feed my baby?   As soon as possible after your baby s birth, you will be encouraged to begin feeding. In the first couple of weeks, your baby will eat often. Breastfeeding babies usually eat at least 8 times in 24 hours. Babies fed formula usually eat at least 7 times in 24 hours.      Breast-feeding tips:    Get comfortable and use pillows for support.    Have your baby at the level of your breast, facing you,  tummy to tummy.       Touch your nipple to your baby s lips so your baby s mouth opens wide (rooting reflex).  Aim the nipple toward the roof of your baby s mouth. When your baby opens his or her mouth, pull your baby toward your breast to help your baby  latch on  to your nipple and much of the areola area.    Hand expressing your breast milk can assist with latching your baby to your breast, if needed.    Ask for help, breastfeeding may seem awkward or uncomfortable at first, this is normal. There are numerous resources available at [facility], clinics and beyond.     If your goal is to exclusively breastfeed, avoid using any formula or artificial nipples (including bottles and pacifiers) while you and your baby are learning to breastfeed unless there is a medical reason.       Mixing breastfeeding and formula can interfere with how you begin building your milk supply. It can impact how you and your baby learn to breastfeeding together and alter the natural growth of  good  bacteria in your baby s stomach.    Delay a pacifier or a bottle in the first few weeks until breastfeeding is well established. This is often around 3 weeks of age.    Ask your nurse to show you how to hand express. Breast milk can be kept in the refrigerator orfreezer for later use.     Hospital Resources:  [facility] Lactation Clinics located at [facility], [facility] and [facility]  Call: [phone].    Inpatient  support    Outpatient appointments    Telephone consultation    Breast-feeding classes available through [employer]            Online Resources:    [email or web address] sign up for free online weekly e-mail    [facility].[email or web address]    Breastfeedingmadesimple.com    [employer] (La Leche League)    Normalfed.com    [facility] Systemth.gov/breastfeeding    Workandpump.com    Breast-feeding Supplies & Pumps:  Talk to your insurance provider or WIC (Women, Infants and Children) to learn more about options available to you. Recent health insurance changes may include additional coverage for supplies and pumps.    Public Health:  Women, Infants and Children Nutrition program (WIC): provides breast-feeding support and education in addition to formal feeding moms. 206-OMY-6408 or http://www.health.[facility].[place]/divs/fh/wic    Family Health Home Visiting: [facility] Nurse home visits are available. Talk to your provider to see if you qualify. [facility] have a program available.    Additional Resources:  La Leche League is an international, nonprofit, nonsectarian organization offering information, education, and support to mothers who want to breast-feed their babies. Local groups offer phone help and monthly meetings. Visit [employer] or [employer] and us the  Find local support  drop down menu or click on the  Resources  tab.    [place] Breastfeeding Resources: 1-164-834-BABY (2223) toll free    National Breastfeeding Help Line trained breastfeeding peer counselors can help answer common breast-feeding questions by phone. Monday-Friday: English/Portuguese  9-430- 776-9115 toll free, 1-224.468.8303 (TTY)    Care Connection: 203-459-CARE (3151)

## 2019-11-26 NOTE — PROGRESS NOTES
Preceptor Attestation:   Patient seen, evaluated and discussed with the resident. I have verified the content of the note, which accurately reflects my assessment of the patient and the plan of care.  Supervising Physician:Tiff Tolentino DO [facility]

## 2019-11-27 LAB
ALLERGIC TO PENICILLIN: NO
GP B STREP AG SPEC QL LA: POSITIVE

## 2019-11-29 ENCOUNTER — OFFICE VISIT (OUTPATIENT)
Dept: FAMILY MEDICINE | Facility: CLINIC | Age: 24
End: 2019-11-29
Payer: MEDICAID

## 2019-11-29 VITALS
TEMPERATURE: 97.9 F | WEIGHT: 236 LBS | SYSTOLIC BLOOD PRESSURE: 112 MMHG | BODY MASS INDEX: 39.27 KG/M2 | RESPIRATION RATE: 20 BRPM | HEART RATE: 103 BPM | DIASTOLIC BLOOD PRESSURE: 71 MMHG | OXYGEN SATURATION: 100 %

## 2019-11-29 DIAGNOSIS — O60.00 PRETERM LABOR WITHOUT DELIVERY: ICD-10-CM

## 2019-11-29 DIAGNOSIS — Z34.83 ENCOUNTER FOR SUPERVISION OF OTHER NORMAL PREGNANCY, THIRD TRIMESTER: Primary | ICD-10-CM

## 2019-11-29 DIAGNOSIS — O26.03 EXCESS WEIGHT GAIN IN PREGNANCY, THIRD TRIMESTER: ICD-10-CM

## 2019-11-29 NOTE — PROGRESS NOTES
Carolina Mcpherson is a 24 year old  female who presents to clinic for a follow up OB visit. She is currently 35w4d with an estimated date of delivery of Dec 30, 2019 via via LMP, consistent with US at 5w. She denies headache, chest pain, shortness of breath, abdominal pain, vaginal bleeding, or abnormal discharge. She has felt baby move.     New concerns today:   - feeling better  Contractions: contractions every 10-20 minutes, last 60 sec. Painful. Not during the day since up and walking.   No leakage of fluid or vaginal bleeding  Weight: up 2 lb today    OB History    Para Term  AB Living   3 2 2 0 0 2   SAB TAB Ectopic Multiple Live Births   0 0 0 0 2      # Outcome Date GA Lbr Ghulam/2nd Weight Sex Delivery Anes PTL Lv   3 Current            2 Term 16 40w1d     None  MICHELE   1 Term 14 37w2d  3.515 kg (7 lb 12 oz) M  EPI N MICHELE      Obstetric Comments   Late to prenatal care with first pregnancy. No prenatal care with second pregnancy        Physical exam:  LMP 2019 (Approximate)     General: NAD  Abdomen: gravid uterus appropriate for gestation age at 37 cm above pubic symphysis, non-tender, FHTs 132  Extremities: no edema    Assessment/Plan:  Patient Active Problem List   Diagnosis     Excess weight gain in pregnancy, third trimester     Encounter for supervision of other normal pregnancy, third trimester      labor without delivery     Prenatal complications   - Late to prenatal care   - Excessive weight gain in pregnancy   -  contractions  requiring terb and magnesium, but no cervical change. Did receive betamethasone x2     GBS positive, discussed what this means. No penicillin allergy.   Discussed when to go to the hospital   Continued to encourage patient for weight maintenance during pregnancy (weight gain of 44lbs thus far in pregnancy)   Patient plans to breastfeed.   Post-partum contraception plans: Nexplanon  Reviewed potential interventions  during labor including amniotomy, operative vaginal delivery and operative  delivery.     Baby BOY - would like circumcision      Follow up on 2019 for prenatal care    Emily Nolen MD   [facility] Resident   Pager: [phone]      Precepted with: Dr. Tolentino

## 2019-11-29 NOTE — RESULT ENCOUNTER NOTE
Patient informed of results at visit with Dr. Gera Nolen, by Salome MarieeAntonella) T. CMA [facility]

## 2019-12-03 ENCOUNTER — HOSPITAL ENCOUNTER (OUTPATIENT)
Dept: OBGYN | Facility: HOSPITAL | Age: 24
Discharge: ANOTHER HOSPITAL | End: 2019-12-03
Attending: FAMILY MEDICINE | Admitting: FAMILY MEDICINE

## 2019-12-03 DIAGNOSIS — O47.9 BRAXTON HICK'S CONTRACTION: ICD-10-CM

## 2019-12-03 LAB — RUPTURE OF FETAL MEMBRANES BY ROM PLUS: NEGATIVE

## 2019-12-03 ASSESSMENT — MIFFLIN-ST. JEOR: SCORE: 1806.83

## 2019-12-05 ENCOUNTER — OFFICE VISIT (OUTPATIENT)
Dept: FAMILY MEDICINE | Facility: CLINIC | Age: 24
End: 2019-12-05
Payer: MEDICAID

## 2019-12-05 VITALS
WEIGHT: 234 LBS | RESPIRATION RATE: 16 BRPM | BODY MASS INDEX: 38.99 KG/M2 | HEIGHT: 65 IN | TEMPERATURE: 97.5 F | OXYGEN SATURATION: 97 % | DIASTOLIC BLOOD PRESSURE: 69 MMHG | HEART RATE: 90 BPM | SYSTOLIC BLOOD PRESSURE: 104 MMHG

## 2019-12-05 DIAGNOSIS — Z34.83 ENCOUNTER FOR SUPERVISION OF OTHER NORMAL PREGNANCY, THIRD TRIMESTER: Primary | ICD-10-CM

## 2019-12-05 DIAGNOSIS — O26.03 EXCESS WEIGHT GAIN IN PREGNANCY, THIRD TRIMESTER: ICD-10-CM

## 2019-12-05 LAB
BACTERIA: NORMAL
BACTERIA: NORMAL
BILIRUBIN UR: NEGATIVE MG/DL
BLOOD UR: NEGATIVE MG/DL
CASTS: NORMAL /LPF
CLARITY, URINE: CLEAR
CLUE CELLS: NORMAL
COLOR UR: YELLOW
CRYSTAL URINE: NORMAL /LPF
EPITHELIAL CELLS UR: <2 /LPF (ref 0–2)
GLUCOSE URINE: NEGATIVE
KETONES UR QL: NEGATIVE MG/DL
LEUKOCYTE ESTERASE UR: ABNORMAL
MOTILE TRICHOMONAS: NEGATIVE
MUCOUS URINE: NORMAL LPF
NITRITE UR QL STRIP: NEGATIVE MG/DL
ODOR: NORMAL
PH UR STRIP: 5.5 [PH] (ref 4.5–8)
PH WET PREP: NORMAL (ref 3.8–4.5)
PROTEIN UR: NEGATIVE MG/DL
RBC URINE: NORMAL /HPF
SP GR UR STRIP: 1.02 (ref 1–1.03)
UROBILINOGEN UR STRIP-ACNC: ABNORMAL E.U./DL
WBC URINE: <2 /HPF
WBC WET PREP: NORMAL (ref 2–5)
YEAST: NORMAL

## 2019-12-05 RX ORDER — HYDROXYZINE PAMOATE 50 MG/1
50 CAPSULE ORAL 3 TIMES DAILY PRN
COMMUNITY

## 2019-12-05 ASSESSMENT — MIFFLIN-ST. JEOR: SCORE: 1813.55

## 2019-12-05 NOTE — PROGRESS NOTES
"Carolina Mcpherson is a 24 year old  female who presents to clinic for a follow up OB visit. She is currently 36w3d with an estimated date of delivery of Dec 30, 2019 via LMP, consistent with US at 5w. She denies headache, chest pain, shortness of breath, abdominal pain, vaginal bleeding, or abnormal discharge. She has felt baby move.     New concerns today:     Was seen in triage on 12/3 with contractions, but no cervical change. Patient concerned about possible ROM, but ROM plus negative. Given prescription for vistaril at discharge   - Contractions since she left maternity every 10 minutes, more at night   - Loose stools 4/day. No nausea or vomiting   Weight: down 1lb from last visit     OB History    Para Term  AB Living   3 2 2 0 0 2   SAB TAB Ectopic Multiple Live Births   0 0 0 0 2      # Outcome Date GA Lbr Ghulam/2nd Weight Sex Delivery Anes PTL Lv   3 Current            2 Term 16 40w1d     None  MICHELE   1 Term 14 37w2d  3.515 kg (7 lb 12 oz) M  EPI N MICHELE      Obstetric Comments   Late to prenatal care with first pregnancy. No prenatal care with second pregnancy        Physical exam:  /69   Pulse 90   Temp 97.5  F (36.4  C) (Oral)   Resp 16   Ht 1.653 m (5' 5.08\")   Wt 106.1 kg (234 lb)   LMP 2019 (Approximate)   SpO2 97%   BMI 38.85 kg/m      General: alert, female in no acute distress  Abdomen: gravid uterus appropriate for gestation age at 38 cm above pubic symphysis, non-tender, FHTs 130, Leopold's maneuver reveals cephalic positioning  Gyn: 1cm/40%/-3, white, chuny discharge  Extremities: no edema    Assessment/Plan:  Patient Active Problem List   Diagnosis     Excess weight gain in pregnancy, third trimester     Encounter for supervision of other normal pregnancy, third trimester      labor without delivery     Prenatal complications   - Late to prenatal care   - Excessive weight gain in pregnancy   -  contractions  requiring terb " and magnesium, but no cervical change. Received betamethasone x2  - Group B strep - POSITIVE      contractions without cervical change    Continued contractions without cervical changes. Suspect that she is very sensitive to [place] contractions. However given white discharge on cervical exam sent wet prep today. Additionally, will send UA/UC due to history of asymptomatic UTIs in pregnancy to assure infection is not causing her contractions.   - Wet prep  - UA/UC    Continued to encourage patient for weight maintenance during pregnancy (weight gain of 44lbs thus far in pregnancy)   Patient plans to breastfeed.   Post-partum contraception plans: Nexplanon  Reviewed potential interventions during labor including amniotomy, operative vaginal delivery and operative  delivery.    Baby BOY -considering circumcision     Reviewed expectations for final month of pregnancy and when to call/come in.    Follow up in 1 week for routine prenatal visit.     Bhavya Bravo DO (PGY3)  [facility] Resident  Pager: [phone]      Precepted with: Alberto Gomez MD

## 2019-12-05 NOTE — PROGRESS NOTES
Preceptor Attestation:   Patient seen, evaluated and discussed with the resident. I have verified the content of the note, which accurately reflects my assessment of the patient and the plan of care.  Supervising Physician:Alberto Gomez MD  [facility]

## 2019-12-05 NOTE — LETTER
December 9, 2019      Carolina Mcpherson  [address] [address]        Dear Carolina,      All of your tests looking for an infection as the cause of your contractions came back negative.     Please see below for your test results.    Resulted Orders   Wet Prep ([facility])   Result Value Ref Range    Yeast Wet Prep None none    Motile Trichomonas Wet Prep Negative Negative    Clue Cells Wet Prep Present >20% NONE    WBC WET PREP 2-5 2 - 5    Bacteria Wet Prep Few None    pH Wet Prep Not performed 3.8 - 4.5    Odor Wet Prep None NONE   Urinalysis, Micro If ([facility])   Result Value Ref Range    Specific Gravity Urine 1.020 1.005 - 1.030    pH Urine 5.5 4.5 - 8.0    Leukocyte Esterase UR Trace (A) NEGATIVE    Nitrite Urine Negative NEGATIVE mg/dL    Protein UR Negative NEGATIVE mg/dL    Glucose Urine Negative NEGATIVE    Ketones Urine Negative NEGATIVE mg/dL    Urobilinogen mg/dL 0.2 E.U./dL 0.2 E.U./dL E.U./dL    Bilirubin UR Negative NEGATIVE mg/dL    Blood UR Negative NEGATIVE mg/dL    Color Urine Yellow     Clarity, urine Clear    Urine Culture ([facility])   Result Value Ref Range    Culture SEE RESULTS BELOW       Comment:      CULTURE, URINE   SOURCE: Urine, Clean Catch   CULTURE RESULTS:    No Growth      Narrative    Test performed by:  [facility] [address] [place] [address]   Urine Microscopic ([facility])   Result Value Ref Range    WBC Urine <2 <5 /hpf    RBC Urine None <5 /hpf    Epithelial Cells UR <2 0 - 2 /lpf    Mucous Urine None NONE lpf    Casts Urine None NONE /lpf    Crystal Urine None NONE /lpf    Bacteria Wet Prep Few None       If you have any questions, please call the clinic to make an appointment.    Sincerely,    Bhavya Bravo, DO

## 2019-12-06 LAB — CULTURE: NORMAL

## 2019-12-09 PROBLEM — B95.1 POSITIVE TESTING FOR GROUP B STREPTOCOCCUS: Status: ACTIVE | Noted: 2019-12-09

## 2019-12-09 NOTE — PROGRESS NOTES
"Carolina Mcpherson is a 24 year old  female who presents to clinic for a follow up OB visit. She is currently 37w1d with an estimated date of delivery of Dec 30, 2019 via LMP, consistent with US at 5w. She denies chest pain, shortness of breath, vaginal bleeding, or abnormal discharge. She has felt baby move.     New concerns today:   -Increasing leg/pelvic pain, feels like heavy weight in pelvis, worse in the AM.    -Diarrhea- Past few days, 2 episodes per day. Denies nausea and vomiting.   - Headaches- frontal, feels like a \"bomb\", 2-3x/day, lasts 1 minute, then goes away spontaneously, sensitive to light, tylenol doesn't help. For the past 2 days   - RUQ pain- feels like a \"stitch\" like you get from running, only with activity, lasts moments and resolves spontaneously     -Contractions: feels them in her back, wrap around to abdomen, started 0600 this morning, 10 minutes apart, can't talk through.   - More discharge but doesn't think its ruptured membranes. No vaginal bleeding   -Weight: decreased appetite lately, mostly eating steak and rice, walking more         OB History    Para Term  AB Living   3 2 2 0 0 2   SAB TAB Ectopic Multiple Live Births   0 0 0 0 2      # Outcome Date GA Lbr Ghulam/2nd Weight Sex Delivery Anes PTL Lv   3 Current            2 Term 16 40w1d     None  MICHELE   1 Term 14 37w2d  3.515 kg (7 lb 12 oz) M  EPI N MICHELE      Obstetric Comments   Late to prenatal care with first pregnancy. No prenatal care with second pregnancy        Physical exam:  /70   Pulse 85   Temp 97.7  F (36.5  C) (Oral)   Resp 24   Ht 1.662 m (5' 5.43\")   Wt 107.6 kg (237 lb 3.2 oz)   LMP 2019 (Approximate)   SpO2 99%   BMI 38.95 kg/m      General: alert, female in no acute distress  Abdomen: gravid uterus appropriate for gestation age at 39 cm above pubic symphysis, non-tender, FHTs 138, Leopold's maneuver reveals vertex positioning  Gyn: 1.5cm/50%/-2, No " discharge  Extremities: no edema    Assessment/Plan:  Patient Active Problem List   Diagnosis     Excess weight gain in pregnancy, third trimester     Encounter for supervision of other normal pregnancy, third trimester      labor without delivery     Prenatal complications   - Late to prenatal care   - Excessive weight gain in pregnancy   -  contractions  requiring terb and magnesium, but no cervical change. Received betamethasone x2  - Group B strep - POSITIVE      Membranes stripped today - discussed that we can continue doing this with cervical checks until she goes into labor. Encouraged her that she is starting to make some change and discussed indications to present to the hospital     Given description of headache and RUQ pain fleeting in nature, and normal BP today, do not feel this represents preeclampsia symptoms and will continue to monitor closely. Again discussed indications to present to maternity.      Continued to encourage patient for weight maintenance during pregnancy (weight gain of 47lbs thus far in pregnancy)   Patient plans to breastfeed.   Post-partum contraception plans: Nexplanon  Reviewed potential interventions during labor including amniotomy, operative vaginal delivery and operative  delivery.    Baby BOY -considering circumcision      Reviewed expectations for final month of pregnancy and when to call/come in.     Follow up on  with Dr. Gera Nolen for routine prenatal visit.     Wan Houston, MS3    I was present with the medical student who participated in the service and in the documentation of this note. I have verified the history and personally performed the physical exam and medical decision making, and have verified the content of the note, which accurately reflects my assessment of the patient and the plan of care.    Bhavya Bravo DO (PGY3)  [facility] Resident  Pager: [phone]      Precepted with: Alberto Gomez MD

## 2019-12-10 ENCOUNTER — OFFICE VISIT (OUTPATIENT)
Dept: FAMILY MEDICINE | Facility: CLINIC | Age: 24
End: 2019-12-10
Payer: MEDICAID

## 2019-12-10 VITALS
OXYGEN SATURATION: 99 % | DIASTOLIC BLOOD PRESSURE: 70 MMHG | BODY MASS INDEX: 39.52 KG/M2 | TEMPERATURE: 97.7 F | HEART RATE: 85 BPM | HEIGHT: 65 IN | SYSTOLIC BLOOD PRESSURE: 119 MMHG | RESPIRATION RATE: 24 BRPM | WEIGHT: 237.2 LBS

## 2019-12-10 DIAGNOSIS — B95.1 POSITIVE TESTING FOR GROUP B STREPTOCOCCUS: ICD-10-CM

## 2019-12-10 DIAGNOSIS — O26.03 EXCESS WEIGHT GAIN IN PREGNANCY, THIRD TRIMESTER: ICD-10-CM

## 2019-12-10 DIAGNOSIS — Z34.83 ENCOUNTER FOR SUPERVISION OF OTHER NORMAL PREGNANCY, THIRD TRIMESTER: Primary | ICD-10-CM

## 2019-12-10 ASSESSMENT — MIFFLIN-ST. JEOR: SCORE: 1833.68

## 2019-12-10 NOTE — LETTER
RETURN TO WORK/SCHOOL FORM    12/10/2019    Re: Carolina Mcpherson  1995      To Whom It May Concern:     Carolina Mcpherson was seen in clinic today. Please excuse patient from work on the 3rd of Dec. She may return to work without restrictions on 12/11/19.          Restrictions:  None, full duty      Bhavya Bravo,   12/10/2019 9:14 AM

## 2019-12-10 NOTE — PATIENT INSTRUCTIONS
[facility] Information  Your resident OB Doctor is Dr. Bhavya Bravo.    If you have any further concerns or wish to schedule another appointment, please call our office at [phone] during normal business hours (8-5, M-F).     For uncomplicated pregnancies, you will have weekly visits from now until you deliver.     If you have urgent medical questions that cannot wait, you may call [phone] at any time of day.     If you have a medical emergency, please call 911.     When to call or come in to [facility] ([phone] for Labor & Delivery unit)  If you notice a decrease in your baby's movement.   If your begin to experience contractions that are 5 minutes or less apart and lasting for over 45 seconds, or if contractions are becoming more painful.  If you have any bleeding or leakage of fluids.   If you have a headache not resolved with tylenol, right upper abdominal pain, or sudden onset of swelling.  You know your body best. Never hesitate to call or go to the hospital if something doesn't feel right!    Preparing for the hospital  You re likely feeling anxious as your child s birth approaches. This is normal. To give yourself some peace of mind, pack a bag 3-4 weeks before your due date. Here is a list of things to remember:  Personal care items like toothbrush, hair brush, lip balm, lotion, shampoo, glasses, contacts  Nightgown, bathrobe, slippers  Several pairs of underwear  Comfortable clothes for you to wear home  Camera with new batteries  Cell phone and   Insurance information and any other paperwork needed for your hospital stay  Clothes for baby to wear home  An infant, rear-facing car seat for bringing home your baby (this is required by law)

## 2019-12-11 ENCOUNTER — HOSPITAL ENCOUNTER (OUTPATIENT)
Dept: OBGYN | Facility: HOSPITAL | Age: 24
Discharge: HOME | End: 2019-12-11
Attending: FAMILY MEDICINE | Admitting: FAMILY MEDICINE

## 2019-12-11 LAB
ABO/RH(D): NORMAL
ANTIBODY SCREEN: NEGATIVE
ERYTHROCYTE [DISTWIDTH] IN BLOOD BY AUTOMATED COUNT: 15.7 % (ref 11–14.5)
HCT VFR BLD AUTO: 29.9 % (ref 35–47)
HGB BLD-MCNC: 9.5 G/DL (ref 12–16)
MCH RBC QN AUTO: 24 PG (ref 27–34)
MCHC RBC AUTO-ENTMCNC: 31.8 G/DL (ref 32–36)
MCV RBC AUTO: 76 FL (ref 80–100)
PLATELET # BLD AUTO: 330 THOU/UL (ref 140–440)
PMV BLD AUTO: 10.5 FL (ref 8.5–12.5)
RBC # BLD AUTO: 3.96 MILL/UL (ref 3.8–5.4)
WBC: 7.6 THOU/UL (ref 4–11)

## 2019-12-11 ASSESSMENT — MIFFLIN-ST. JEOR
SCORE: 1806.83
SCORE: 1806.83

## 2019-12-12 ENCOUNTER — OFFICE VISIT (OUTPATIENT)
Dept: FAMILY MEDICINE | Facility: CLINIC | Age: 24
End: 2019-12-12
Payer: MEDICAID

## 2019-12-12 VITALS
HEIGHT: 66 IN | WEIGHT: 235.2 LBS | RESPIRATION RATE: 20 BRPM | HEART RATE: 82 BPM | TEMPERATURE: 97.4 F | SYSTOLIC BLOOD PRESSURE: 120 MMHG | DIASTOLIC BLOOD PRESSURE: 79 MMHG | OXYGEN SATURATION: 98 % | BODY MASS INDEX: 37.8 KG/M2

## 2019-12-12 DIAGNOSIS — Z34.83 ENCOUNTER FOR SUPERVISION OF OTHER NORMAL PREGNANCY, THIRD TRIMESTER: Primary | ICD-10-CM

## 2019-12-12 LAB — T PALLIDUM AB SER QL: NEGATIVE

## 2019-12-12 ASSESSMENT — MIFFLIN-ST. JEOR: SCORE: 1829.61

## 2019-12-12 NOTE — PROGRESS NOTES
Preceptor Attestation:  Patient's case reviewed and discussed with Emily Nolen MD resident and I evaluated the patient. I agree with written assessment and plan of care.  Supervising Physician:  СВЕТЛАНА ADAMS MD  [facility]

## 2019-12-12 NOTE — PROGRESS NOTES
Carolina Mcpherson is a 24 year old  female who presents to clinic for a follow up OB visit. She is currently 37w3d with an estimated date of 2delivery of Dec 30, 2019 via LMP. She denies headache, chest pain, shortness of breath, abdominal pain/contractions, vaginal bleeding, or abnormal discharge. She has felt baby move.     New concerns today:   - every 5 min contractions lasting 60 sec started 3 hours ago   - no leakage of fluid  - vaginal bleeding: yes--bright red with mucus, big mucus plug came out this AM  - was at OB triage yesterday   - was 4 cm dilated yesterday     OB History    Para Term  AB Living   3 2 2 0 0 2   SAB TAB Ectopic Multiple Live Births   0 0 0 0 2      # Outcome Date GA Lbr Ghulam/2nd Weight Sex Delivery Anes PTL Lv   3 Current            2 Term 16 40w1d     None  MICHELE   1 Term 14 37w2d  3.515 kg (7 lb 12 oz) M  EPI N MICHELE      Obstetric Comments   Late to prenatal care with first pregnancy. No prenatal care with second pregnancy        Physical exam:  LMP 2019 (Approximate)     General: alert, female in no acute distress  Abdomen: gravid uterus appropriate for gestation age at 38 cm above pubic symphysis, non-tender, FHTs 130, Leopold's maneuver reveals cephalic positioning  Gyn: 4cm/50%/-1, normal discharge  Extremities: no edema    Assessment/Plan:  Patient Active Problem List   Diagnosis     Excess weight gain in pregnancy, third trimester     Encounter for supervision of other normal pregnancy, third trimester      labor without delivery     Positive testing for group B Streptococcus       (Z34.83) Encounter for supervision of other normal pregnancy, third trimester  (primary encounter diagnosis)  Comment:   Plan:     At this point is not in active labor. Has not had cervical change per my check today compared to cervical check yesterday at 5 pm. No vaginal bleeding on cervical exam today.   Discussed when to go to maternity care.   GBS  positive. Will need penicillin in labor. PCN allergic? no  Reviewed signs/symptoms of labor and when to present to the hospital.   Follow up in 1 week for routine prenatal visit, sooner if labor symptoms.       Emily Nolen MD   [facility] Resident   Pager: [phone]     Precepted with: Amberly High MD

## 2020-01-28 ENCOUNTER — DOCUMENTATION ONLY (OUTPATIENT)
Dept: FAMILY MEDICINE | Facility: CLINIC | Age: 25
End: 2020-01-28

## 2020-01-28 NOTE — PROGRESS NOTES
"Patient successfully delivered a  on 12/15/2019. Delivered at [facility]. Per documentation sent from [facility]: \"38w0d admitted for augmentation of labor given advanced cervical dilation, contractions with increasing pressure, multiparity, GBS positive and history of PPH. Labor was augmented with pitocin. Nitrous gas was administered for pain control. AROM was performed at 1405 with clear fluid. She precipitously progressed to complete cervical dilation, pushed and with good maternal effort delivered a liveborn male infant on 12/15/19 at 1501, APGARS 9/9, weight 7 lbs 8 oz.\" Episode has been resolved. Tiny RN  "

## 2021-01-11 NOTE — NURSING NOTE
"  SUBJECTIVE:                                                    ADDICTION MEDICINE NOTE    Hoang Kiser is a 35 year old male who presents to clinic today for Addiction Medicine consult         [place] Board of Pharmacy Data Base Reviewed:    Yes ;     No Adderall since 9/16/20; no Opioids since 8/17/20      Brief History:    Here on request of Pain provider, Tamiko Stevens    Being treated for Lumbar DDD, Functional neurologic movement disorder    Also being treated by  [facility] for Depression, Attention Deficit Disorder    Referred to Addiction medicine due to positive UDS for methamphetamine    Patient admits to history of Addiction    Was treated in an MI/CD program in [place] in 2015, [facility] in 2016 for 13 months, and [facility] in 2019    Now says he's \"burnt out on treatment \"    Was involved with recovery support groups in past    Says he has had up to 4 years sobriety in the past    Past drug use includes prescription opioids, cocaine    Has been prescribed benzodiazepines in the past    Started using Methamphetamine early 2020    Says he didn't really use it to get high nor does he crave it but instead used it to self medicate his ADD    Says he has trouble with focusing and methamphetamine helped    Has been prescribed Adderall but felt dose insufficient at times    Now using Naprosyn for pain    Realizes he cannot use methamphetamine and doesn't see a problem stopping    Last use 1 week ago    Weaning off left over hydrocodone; no withdrawal but feels pain not controlled    Was recommended to seek treatment but has procrastinated    Advised [facility] [place] out-patient MI/CD program may be helpful and a good fit    Not requesting any MAT as has no craving    Lives in a group home where medications are controlled and dispensed      HPI:    In clinic visit 1/11/21    Here requesting help with pain management using Suboxone     Referred by Pain provider    Says pain less manageable " Patient set up for appointment at [facility] [place] for tomorrow morning (11/05/2019). Labs, snapshot, and pertinent records faxed along with order. Tiny JOHNSON   "lately, interfering with function    Describes pain as in front of hips, low back, neck    Level is 7-8 frequently    Says it is like \"pins and needles anf grinding\"    Had been on Morphine, Hydrocodone in the past; no desire to resume full agonist    UDS positive for amphetamine; will check confirmation; patient says he recently used an old Adderall    Not working    Walks daily    Lives in group home    Discussed pros and cons, risks and benefits of Suboxone     Denies any opioid use recently    Will begin Suboxone 2 mg BID    Questions answered    Follow up video visit in 1 week    Social History     Social History Narrative    He lives in [facility] in a chemical treatment center - there are 11 people there. He arrives today through [employer] ride    He has 3 brothers and 3 sisters. He has not children. He has never been .      Mother and father are alive    One sister is an alcoholic and bulemic    depression is present in the family : mother, sister and 2 brothers are bipolar.      He denies bipolar. He has depression.    He denies recurring thoughts. He has had substance abuse issues. He has had cravings in the past.    He exercises and plays guitar and draws.      He has used meth as well as prescription pain killers.    He has used marijuana when young. Used cocaine in the past.    Has not used iv drugs    He does not drink alcohol.      50% Mongolian and is New Zealander, english and french and a bit native.    He smokes cigarettes - 1 pack per day.        single. lives in [place]. estela is father           Problem list and histories reviewed & adjusted, as indicated.  Additional history: as documented           albuterol (PROAIR HFA/PROVENTIL HFA/VENTOLIN HFA) 108 (90 Base) MCG/ACT inhaler, INHALE ONE TO TWO PUFFS INTO THE LUNGS EVERY FOUR HOURS AS NEEDED FOR SHORTNESS OF BREATH/ DYSPNEA OR WHEEZING       atomoxetine (STRATTERA) 80 MG capsule, Take 1 capsule (80 mg) by mouth daily       " cloNIDine (CATAPRES) 0.1 MG tablet, Take 1-2 tablets (0.1-0.2 mg) by mouth nightly as needed (sleep)       cyclobenzaprine (FLEXERIL) 10 MG tablet, Take 0.5-1 tablets (5-10 mg) by mouth 3 times daily as needed for muscle spasms Stop Tizanidine.       diazepam (VALIUM) 2 MG tablet, Take one tab 30-45 minutes prior to MRIs.       DULoxetine (CYMBALTA) 30 MG capsule, Take 1 cap daily 2 weeks after being on Viibryd 20 mg daily       DULoxetine (CYMBALTA) 60 MG capsule, Take 1 capsule (60 mg) by mouth daily       hydrOXYzine (ATARAX) 25 MG tablet, Take 1 tablet (25 mg) by mouth nightly as needed (at HS PRN)       naloxone (NARCAN) 4 MG/0.1ML nasal spray, Spray 1 spray (4 mg) into one nostril alternating nostrils as needed for opioid reversal every 2-3 minutes until assistance arrives       naproxen (NAPROSYN) 500 MG tablet, Take 1 tablet (500 mg) by mouth 2 times daily (with meals)       order for DME, Equipment being ordered: Digital home blood pressure monitor kit       order for DME, Equipment being ordered: 4 wheeled walker with bench seat.       oxybutynin ER (DITROPAN-XL) 10 MG 24 hr tablet, TAKE 1 TABLET BY MOUTH ONCE DAILY *1 TOTAL FILL* **MUST KEEP APPOINTMENT ON 7/31/20 FOR MORE REFILLS*       SF 5000 PLUS 1.1 % CREA,        vilazodone (VIIBRYD) 10 MG TABS tablet, Take 1 tab daily for    No current facility-administered medications on file prior to visit.       Allergies   Allergen Reactions     Amitriptyline      Ineffective in reducing spasms/movement, increased fatigue  Other reaction(s): Other (see comments)     Buspirone Hcl Other (See Comments)     Panic attacks     Doxycycline Diarrhea, Fatigue, GI Disturbance and Nausea     Other reaction(s): GI intolerance     Trazodone Fatigue     Other reaction(s): Other (see comments)     Cephalexin Diarrhea     Other reaction(s): GI intolerance           REVIEW OF SYSTEMS:  General:  No acute withdrawal symptoms.  No recent infections or fever  Eyes:  No vision  concerns.  No double vision.    Resp: No coughing, wheezing or shortness of breath  CV: No chest pains or palpitations  GI: No nausea, vomiting, abdominal pain, diarrhea.  No constipation  : No urinary frequency or dysuria    Musculoskeletal: No significant muscle or joint pains other than as above.  No edema  Neurologic: No numbness, tingling, weakness, problems with balance or coordination  Psychiatric: No acute concerns other than as above.   Skin: No rashes or areas of acute infection    OBJECTIVE:    PHYSICAL EXAM:  /78   Pulse 82   Temp 98  F (36.7  C) (Oral)   Wt 90.7 kg (200 lb)   SpO2 99%   BMI 31.10 kg/m      GENERAL: Healthy, alert and no distress  EYES: Eyes grossly normal to inspection.  No discharge or erythema, or obvious scleral/conjunctival abnormalities.  RESP: No audible wheeze, cough, or visible cyanosis.  No visible retractions or increased work of breathing.    SKIN: Visible skin clear. No significant rash, abnormal pigmentation or lesions.  NEURO: Cranial nerves grossly intact.  Mentation and speech appropriate for age.  PSYCH: Mentation appears normal, affect normal/bright, judgement and insight intact, normal speech and appearance well-groomed.    Results for orders placed or performed in visit on 01/11/21   Drug Abuse Screen Panel 13, Urine (Pain Care Package)     Status: Abnormal   Result Value Ref Range    Cannabinoids (44-sdi-0-carboxy-9-THC) Not Detected NDET^Not Detected ng/mL    Phencyclidine (Phencyclidine) Not Detected NDET^Not Detected ng/mL    Cocaine (Benzoylecgonine) Not Detected NDET^Not Detected ng/mL    Methamphetamine (d-Methamphetamine) Detected, Abnormal Result (A) NDET^Not Detected ng/mL    Opiates (Morphine) Not Detected NDET^Not Detected ng/mL    Amphetamine (d-Amphetamine) Detected, Abnormal Result (A) NDET^Not Detected ng/mL    Benzodiazepines (Nordiazepam) Not Detected NDET^Not Detected ng/mL    Tricyclic Antidepressants (Desipramine) Not Detected  NDET^Not Detected ng/mL    Methadone (Methadone) Not Detected NDET^Not Detected ng/mL    Barbiturates (Butalbital) Not Detected NDET^Not Detected ng/mL    Oxycodone (Oxycodone) Not Detected NDET^Not Detected ng/mL    Propoxyphene (Norpropoxyphene) Not Detected NDET^Not Detected ng/mL    Buprenorphine (Buprenorphine) Not Detected NDET^Not Detected ng/mL           ASSESSMENT:    Stimulant use disorder  Chronic back pain  Attention Deficit disorder  Depression  Functional neurologic movement disorder    PLAN:    Addiction Medicine recommendations:    Suboxone 2 mg BID    Follow up 1 week by video    Peng Nicholson MD  [facility] Addiction Medicine  [phone]

## 2021-05-31 NOTE — PROGRESS NOTES
Pt presents to [facility] s/p fall from standing. She states she was trying to pull one of her kids down from a bunk bed and he was kicking and elbowing her in the chest and stomach. When he got to the floor, his feet entangled hers and she fell on her belly onto her sons elbow. No vaginal bleeding. States she feels some tightening. -145. OB resident at bedside.     Kim Fuentes

## 2021-06-01 NOTE — PROGRESS NOTES
[facility] OB Triage    Subjective: Carolina Mcpherson is a  24 y.o. female at 25w4d without regular OB follow up who presents to OB triage with jared abdominal pain for one day. She noted that she was feeling this pain earlier at work and a co-worker told her she was sweating at which point she presented to [email or web address]. Patient reports backache, headache, no bleeding, no contractions and no leaking. She has had some urinary frequency but no dysuria or hematuria.  Fetal Movement: normal-increased.    Estimated Date of Delivery: 19 Patient's last menstrual period was 2019 (exact date).  OB provider: Provider, No Primary Care  OB History        3    Para   2    Term   2            AB        Living   2       SAB        TAB        Ectopic        Multiple        Live Births   2                 Objective:   Blood pressure 120/71, pulse 82, temperature 98.2  F (36.8  C), last menstrual period 2019, not currently breastfeeding.  General:   alert, appears stated age and cooperative   Skin:   normal   HEENT:  PERRLA and extra ocular movement intact   Lungs:   clear to auscultation bilaterally   Heart:   regular rate and rhythm, S1, S2 normal, no murmur, click, rub or gallop   Extremities: Warm, dry and well perfused. No peripheral edema.   Neuro: Reflexes 2+ and symmetric.    Abdomen: FHT present   Hutto:  None   FHT: Baseline: 135 bpm and Variability: Moderate (6 - 25 bpm)   Cervix: Not examined     Laboratory Studies:   Results for orders placed or performed during the hospital encounter of 19   Collect and send wet prep vaginal specimen as indicated by prenatal history and/or patient complaints   Result Value Ref Range    Yeast Result No yeast seen No yeast seen    Trichomonas No Trichomonas seen No Trichomonas seen    Clue Cells, Wet Prep No Clue cells seen No Clue cells seen   Type and Screen   Result Value Ref Range    ABORh O POS     Antibody Screen Negative  Negative   HM2 (CBC W/O DIFF)   Result Value Ref Range    WBC 7.9 4.0 - 11.0 thou/uL    RBC 3.84 3.80 - 5.40 mill/uL    Hemoglobin 10.4 (L) 12.0 - 16.0 g/dL    Hematocrit 32.5 (L) 35.0 - 47.0 %    MCV 85 80 - 100 fL    MCH 27.1 27.0 - 34.0 pg    MCHC 32.0 32.0 - 36.0 g/dL    RDW 13.7 11.0 - 14.5 %    Platelets 336 140 - 440 thou/uL    MPV 9.7 8.5 - 12.5 fL   Drug Abuse 1+, Urine   Result Value Ref Range    Amphetamines Screen Negative Screen Negative    Benzodiazepines Screen Negative Screen Negative    Opiates Screen Negative Screen Negative    Phencyclidine Screen Negative Screen Negative    THC Screen Negative Screen Negative    Barbiturates Screen Negative Screen Negative    Cocaine Metabolite Screen Negative Screen Negative    Methadone Screen Negative Screen Negative    Oxycodone Screen Negative Screen Negative    Creatinine, Urine 78.4 mg/dL   Alcohol, Ethyl, Urine Screen   Result Value Ref Range    Alcohol, Urine <10 None detected mg/dL   Urinalysis-UC if Indicated   Result Value Ref Range    Color, UA Yellow Colorless, Yellow, Straw, Light Yellow    Clarity, UA Hazy (!) Clear    Glucose, UA Negative Negative    Bilirubin, UA Negative Negative    Ketones, UA 25 mg/dL (!) Negative    Specific Gravity, UA 1.005 1.001 - 1.030    Blood, UA Negative Negative    pH, UA 7.0 4.5 - 8.0    Protein, UA 30 mg/dL (!) Negative mg/dL    Urobilinogen, UA 8.0 E.U./dL (!) <2.0 E.U./dL, 2.0 E.U./dL    Nitrite, UA Positive (!) Negative    Leukocytes, UA Moderate (!) Negative    Bacteria, UA Many (!) None Seen hpf    RBC, UA 0-2 None Seen, 0-2 hpf    WBC, UA 10-25 (!) None Seen, 0-5 hpf    Squam Epithel, UA  (!) None Seen, 0-5 lpf    Mucus, UA Few (!) None Seen lpf        ASSESSMENT AND PLAN: Carolina Mcpherson is a  24 y.o. female who presented to [facility] at 25w4d on 2019 with jared abdominal pain and urinary frequency   1. Not in labor.  2. Negative UTox, wet prep  3. Prenatal labs  obtained due to poor follow up. Pt provided information for providers in the area and urged to begin follow up as soon as possible.  4. UA positive for Nitrites, leuk esterase, and many bacteria. UTI vs. Asymptomatic bacteriurea. Urine cultures pending. With positive UA and pt hx of preeclampsia will treat with Keflex 500mg three times a day for 5 days.      Patient discussed with attending physician, Dr.Darin Estrada, who agrees with the plan.      Hu Gomes MD PGY1 9/19/2019  [facility]

## 2021-06-01 NOTE — PROGRESS NOTES
Pt given DC instructions. She verbalized understanding and had no further questions or concerns. She had her belongings in her possession and ambulated upon dc from unit.

## 2021-06-01 NOTE — PROGRESS NOTES
D: Carolina is here reporting uterine contractions.She reports she has had very little oral hydration today and that she is uncomfortable with urination.   A:  Fetal heart monitor and tocco applied, oral hydration offered. OB Resident notified.  R:  Continue to monitor and assess, will obtain labs as ordered.

## 2021-06-03 VITALS — BODY MASS INDEX: 39.15 KG/M2 | WEIGHT: 235 LBS | HEIGHT: 65 IN

## 2021-06-03 VITALS — HEIGHT: 65 IN | BODY MASS INDEX: 39.15 KG/M2 | WEIGHT: 235 LBS

## 2021-06-03 VITALS — BODY MASS INDEX: 37.82 KG/M2 | WEIGHT: 227 LBS | HEIGHT: 65 IN

## 2021-06-03 NOTE — PROGRESS NOTES
OB Triage Note    Carolina Mcpherson is a 24 y.o.  at 36w2d of gestation based on dating ultrasound who has presented to maternity care for further evaluation of contractions and possible ROM. She states that these contractions started at 5AM this morning and have made it difficult for her to get comfortable and she has not been able to sleep. Feels these contractions are different than other times she has presented to maternity and are worse in her back. Back pain is only associated with abdominal tightening (contractions). At 1200 she felt a trickle of fluid run down her leg. This has not continued since that time.  She denies vaginal bleeding, dysuria, frequency, urgency, increased vaginal discharge.  Fetal movement is present.    PRENATAL CARE: Seen by Dr. Bravo at [place] [facility].    PAST MEDICAL HISTORY: None   PRIOR PREGNANCIES: 2 prior uncomplicated vaginal deliveries (late to prenatal care with both)   HISTORY OF COMPLICATIONS : limited prenatal care with all pregnancies     PAST SURGICAL HISTORY: Reviewed and none    MEDICATIONS:  Prenatal vitamins    SOCIAL HISTORY:    Smoking: None   Alcohol: None   Illicit drug use: None    PHYSICAL EXAMINATION:   Temp:  [98.3  F (36.8  C)] 98.3  F (36.8  C)  Heart Rate:  [103] 103  Resp:  [20] 20  BP: (140)/(87) 140/87     General: alert, well-appearing, non-toxic, NAD  Abdomen: gravid, soft, non-tender, mild contractions palpated   Back: No tenderness to palpation, no CVA tenderness   Extremities: warm, no edema  Cervical exam: 40/-3    FETAL HEART MONITORING: Category 1, , moderate variability, accelerations present, decelerations absent. Unable to  contractions on monitor, though patient feeling every 2-3 minutes     LAB RESULTS: Reviewed and ROM plus negative     ASSESSMENT/PLAN:   Contractions starting 10 hours prior to presentation to maternity with no cervical change since last check about 2 weeks prior. Patient concerned about  possible rupture of membranes, however does not continue to leak fluid and ROM plus negative. Patient monitored for 1 hour, Cat 1 tracing, and rechecked with unchanged cervix. She was given a dose of vistaril and will be prescribed this at discharge.   - Discussed indications to return to maternity   - Has appointment in clinic on Thursday        Bhavya Bravo DO (PGY3)  [facility] Resident  Pager: [phone]    Precepted patient with Dr. Trey Catalan

## 2021-06-03 NOTE — TELEPHONE ENCOUNTER
Patient calling - states she has a [facility] PCP.  Transferred to [facility] nurse line.    Kaila Palomo RN  Triage Nurse Advisor

## 2021-06-03 NOTE — PROGRESS NOTES
[place] [facility] [place] with reports of contractions that started yesterday and intensified at 1700 today.  Pt reports baby is moving as usual, denies bleeding or leaking fluids.  Pt reports the contraction pain wraps around to her back.  Charge RN aware and is notifying the resident of her arrival.

## 2021-06-03 NOTE — PROGRESS NOTES
1526: updated Dr. Bravo of pt arrival [place] [facility] due to contractions and thinking her membranes ruptured around noon today. Contractions not picking up well on the monitor by RN palpating them q 2-3 min apart. Pt complaining of intense back contraction pain. ROM+ sent and waiting for result. Cervical exam upon admission was 1/40/-3. Dr Bravo will call back in a few min.     1538: Dr. Bravo at bedside evaluating pt labor status.     1605: Updated Dr Bravo ROM+ came back negative. Would like me to recheck pt cervix and call back with an update.    1610: Cervix remains the same. Dr Bravo would like another NST and pt to have a dose of vistaril at this time. (see new order) Once we have a reactive NST Dr Bravo would like the pt discharged to home. (see new order)       1648: Discharge summary and education gone over with pt. All questions and concerns were answered at this time. Will follow up with provider as scheduled. Will discharge to home with significant other driving.

## 2021-06-03 NOTE — PROGRESS NOTES
Patient reports feeling less uterine activity now then when she presented to unit.  IV bolus complete. Cervix is unchanged. IV removed. Discharge instructions for patient reviewed. She denies any further questions and is to follow up with provider tomorrow at previously scheduled appointment.  Patient is discharged home ambulatory.

## 2021-06-04 NOTE — PLAN OF CARE
Patient understands the plan for a few hours. Will be up moving and plans to tub for comfort. Antibiotics due to GBS positive status started per order.

## 2021-06-04 NOTE — H&P
"OB Admission H&P  Date: 2019  Time: 11:06 AM  Carolina Mcpherson,  1995, MRN [id number]    CC: contractions    HPI: Carolina Mcpherson is a 24 y.o. year old  at 37w3d weeks by LMP, consistent with 5w US presenting with regular and painful contractions since this morning.  She denies leakage of fluid and vaginal bleeding and reports good fetal movement.    Prenatal Complications:    - Late to prenatal care   - Excessive weight gain in pregnancy   -  contractions  requiring terb and magnesium, but no cervical change. Received betamethasone x2  - Group B strep - POSITIVE       ROS: She denies fevers, chills, chest pain, shortness of breath, nausea, vomiting, or dysuria    OB/Gyn History  OB History    Para Term  AB Living   3 2 2     2   SAB TAB Ectopic Multiple Live Births           2      # Outcome Date GA Lbr Ghulam/2nd Weight Sex Delivery Anes PTL Lv   3 Current            2 Term 16 40w1d  7 lb 14.7 oz (3.592 kg) M Vag-Spont EPI N MICHELE   1 Term 14 37w2d  6 lb 13 oz (3.09 kg) M Vag-Spont EPI N MICHELE       Physical Exam:  /70 (Patient Position: Semi-echavarria, Cuff Size: Adult Regular)   Pulse 83   Temp 97.4  F (36.3  C) (Oral)   Resp 18   Ht 5' 5\" (1.651 m)   Wt (!) 235 lb (106.6 kg)   LMP 2019 (Exact Date)   BMI 39.11 kg/m      Gen: no acute distress, resting comfortably   CV: acyanotic   Pulm: unlabored respirations   Abd: gravid, soft, nontender   Extremities: soft, nontender    Cervical Exam: 70/-2 mid position   FHR: 130bpm, mod jacqueline, +accels, -decels  [place]:  q5min    Prenatal Labs  Blood type: O+  GBS POSITIVE  Rubella immune, Hep B negative, HIV negative, RPR non-reactive   GC / CT negative  1 hr GCT - normal     Impression:  Carolina Mcpherson is a 24 y.o. year old  at 37w3d weeks admitted for early labor. Fetal status reassuring.   Prenatal Complications:    - Late to prenatal care   - Excessive weight gain in pregnancy (47lbs)  -  " contractions 11/13 requiring terb and magnesium, but no cervical change. Received betamethasone x2  - Group B strep - POSITIVE     Plan:     Spontaneous Labor, anticipate Vaginal Delivery        Given making cervical change and GBS positive, will start antibiotics now    Plans to breastfeed    Anesthesia during labor plan: non-pharmacological methods     Expecting baby BOY - considering circumcision (undecided)     Contraception: Nexplanon at 6w follow up        Bhavya Bravo DO (PGY3)  [facility] Resident  Pager: [phone]  12/11/2019, 11:06 AM    Precepted patient with Dr. Catalan.   ------------------------------------------------------------------------------------------------------------    Pertinent Labs  Lab Requisition on 12/05/2019   Component Date Value Ref Range Status     Culture 12/05/2019 No Growth   Final   Admission on 12/03/2019, Discharged on 12/03/2019   Component Date Value Ref Range Status     Rupture Fetal Membrane 12/03/2019 Negative  Negative Final   Lab Requisition on 11/25/2019   Component Date Value Ref Range Status     Group B Strep 11/25/2019 Positive* Negative Final     Allergic to Penicillin 11/25/2019 No   Final   Admission on 11/19/2019, Discharged on 11/20/2019   Component Date Value Ref Range Status     Color, UA 11/19/2019 Yellow  Colorless, Yellow, Straw, Light Yellow Final     Clarity, UA 11/19/2019 Clear  Clear Final     Glucose, UA 11/19/2019 Negative  Negative Final     Bilirubin, UA 11/19/2019 Negative  Negative Final     Ketones, UA 11/19/2019 Negative  Negative, 60 mg/dL Final     Specific Gravity, UA 11/19/2019 1.007  1.001 - 1.030 Final     Blood, UA 11/19/2019 Negative  Negative Final     pH, UA 11/19/2019 7.0  4.5 - 8.0 Final     Protein, UA 11/19/2019 Negative  Negative mg/dL Final     Urobilinogen, UA 11/19/2019 <2.0 E.U./dL  <2.0 E.U./dL, 2.0 E.U./dL Final     Nitrite, UA 11/19/2019 Negative  Negative Final     Leukocytes, UA 11/19/2019  Moderate* Negative Final     Bacteria, UA 11/19/2019 None Seen  None Seen hpf Final     RBC, UA 11/19/2019 0-2  None Seen, 0-2 hpf Final     WBC, UA 11/19/2019 0-5  None Seen, 0-5 hpf Final     Squam Epithel, UA 11/19/2019 >100* None Seen, 0-5 lpf Final     WBC Clumps 11/19/2019 Present* None Seen Final     Amorphous, UA 11/19/2019 Many* None Seen Final     Mucus, UA 11/19/2019 Few* None Seen lpf Final     Culture 11/19/2019 Mixture of urogenital organisms   Final   Admission on 11/13/2019, Discharged on 11/17/2019   Component Date Value Ref Range Status     Fetal Fibronectin 11/13/2019 Negative  Negative Final    A negative fFN result post 24 wks is reported to be a predictor of near term lack of risk of premature L&D.    .     Group B Strep 11/13/2019 Negative  Negative Final     Allergic to Penicillin 11/13/2019 No   Final     Color, UA 11/13/2019 Yellow  Colorless, Yellow, Straw, Light Yellow Final     Clarity, UA 11/13/2019 Clear  Clear Final     Glucose, UA 11/13/2019 Negative  Negative Final     Bilirubin, UA 11/13/2019 Negative  Negative Final     Ketones, UA 11/13/2019 Negative  Negative, 60 mg/dL Final     Specific [place], UA 11/13/2019 1.019  1.001 - 1.030 Final     Blood, UA 11/13/2019 Negative  Negative Final     pH, UA 11/13/2019 6.0  4.5 - 8.0 Final     Protein, UA 11/13/2019 30 mg/dL* Negative mg/dL Final     Urobilinogen, UA 11/13/2019 2.0 E.U./dL  <2.0 E.U./dL, 2.0 E.U./dL Final     Nitrite, UA 11/13/2019 Negative  Negative Final     Leukocytes, UA 11/13/2019 Moderate* Negative Final     Bacteria, UA 11/13/2019 None Seen  None Seen hpf Final     RBC, UA 11/13/2019 0-2  None Seen, 0-2 hpf Final     WBC, UA 11/13/2019 0-5  None Seen, 0-5 hpf Final     Squam Epithel, UA 11/13/2019 * None Seen, 0-5 lpf Final     Mucus, UA 11/13/2019 Many* None Seen lpf Final     Yeast Result 11/13/2019 No yeast seen  No yeast seen Final     Trichomonas 11/13/2019 No Trichomonas seen  No Trichomonas seen Final      Clue Cells, Wet Prep 11/13/2019 No Clue cells seen  No Clue cells seen Final     Rupture Fetal Membrane 11/13/2019 Negative  Negative Final     Amphetamines 11/13/2019 Screen Negative  Screen Negative Final     Benzodiazepines 11/13/2019 Screen Negative  Screen Negative Final     Opiates 11/13/2019 Screen Negative  Screen Negative Final     Phencyclidine 11/13/2019 Screen Negative  Screen Negative Final     THC 11/13/2019 Screen Negative  Screen Negative Final     Barbiturates 11/13/2019 Screen Negative  Screen Negative Final     Cocaine Metabolite 11/13/2019 Screen Negative  Screen Negative Final     Methadone 11/13/2019 Screen Negative  Screen Negative Final     Oxycodone 11/13/2019 Screen Negative  Screen Negative Final     Creatinine, Urine 11/13/2019 206.7  mg/dL Final     Alcohol, Urine 11/13/2019 <10  None detected mg/dL Final    None Detected     Culture 11/13/2019 Mixture of urogenital organisms   Final     Magnesium 11/14/2019 3.8* 1.8 - 2.6 mg/dL Final     Magnesium 11/14/2019 4.3* 1.8 - 2.6 mg/dL Final     Magnesium 11/14/2019 4.6* 1.8 - 2.6 mg/dL Final     Magnesium 11/14/2019 4.0* 1.8 - 2.6 mg/dL Final     Magnesium 11/15/2019 2.7* 1.8 - 2.6 mg/dL Final   Admission on 11/06/2019, Discharged on 11/07/2019   Component Date Value Ref Range Status     Group B Strep 11/06/2019 Negative  Negative Final     Allergic to Penicillin 11/06/2019 No   Final     Color, UA 11/06/2019 Yellow  Colorless, Yellow, Straw, Light Yellow Final     Clarity, UA 11/06/2019 Clear  Clear Final     Glucose, UA 11/06/2019 Negative  Negative Final     Bilirubin, UA 11/06/2019 Negative  Negative Final     Ketones, UA 11/06/2019 Trace* Negative, 60 mg/dL Final     Specific Gravity, UA 11/06/2019 1.016  1.001 - 1.030 Final     Blood, UA 11/06/2019 Negative  Negative Final     pH, UA 11/06/2019 6.0  4.5 - 8.0 Final     Protein, UA 11/06/2019 Trace* Negative mg/dL Final     Urobilinogen, UA 11/06/2019 4.0 E.U./dL* <2.0 E.U./dL, 2.0  E.U./dL Final     Nitrite, UA 11/06/2019 Negative  Negative Final     Leukocytes, UA 11/06/2019 Trace* Negative Final     Bacteria, UA 11/06/2019 None Seen  None Seen hpf Final     RBC, UA 11/06/2019 0-2  None Seen, 0-2 hpf Final     WBC, UA 11/06/2019 5-10* None Seen, 0-5 hpf Final     Squam Epithel, UA 11/06/2019 25-50* None Seen, 0-5 lpf Final     Mucus, UA 11/06/2019 Many* None Seen lpf Final     Yeast Result 11/06/2019 No yeast seen  No yeast seen Final     Trichomonas 11/06/2019 No Trichomonas seen  No Trichomonas seen Final     Clue Cells, Wet Prep 11/06/2019 No Clue cells seen  No Clue cells seen Final     Amphetamines 11/06/2019 Screen Negative  Screen Negative Final     Benzodiazepines 11/06/2019 Screen Negative  Screen Negative Final     Opiates 11/06/2019 Screen Negative  Screen Negative Final     Phencyclidine 11/06/2019 Screen Negative  Screen Negative Final     THC 11/06/2019 Screen Negative  Screen Negative Final     Barbiturates 11/06/2019 Screen Negative  Screen Negative Final     Cocaine Metabolite 11/06/2019 Screen Negative  Screen Negative Final     Oxycodone 11/06/2019 Screen Negative  Screen Negative Final     Creatinine, Urine 11/06/2019 141.6  mg/dL Final     Culture 11/06/2019 No Growth   Final   Admission on 09/19/2019, Discharged on 09/19/2019   Component Date Value Ref Range Status     ABORh 09/19/2019 O POS   Final     Antibody Screen 09/19/2019 Negative  Negative Final     WBC 09/19/2019 7.9  4.0 - 11.0 thou/uL Final     RBC 09/19/2019 3.84  3.80 - 5.40 mill/uL Final     Hemoglobin 09/19/2019 10.4* 12.0 - 16.0 g/dL Final     Hematocrit 09/19/2019 32.5* 35.0 - 47.0 % Final     MCV 09/19/2019 85  80 - 100 fL Final     MCH 09/19/2019 27.1  27.0 - 34.0 pg Final     MCHC 09/19/2019 32.0  32.0 - 36.0 g/dL Final     RDW 09/19/2019 13.7  11.0 - 14.5 % Final     Platelets 09/19/2019 336  140 - 440 thou/uL Final     MPV 09/19/2019 9.7  8.5 - 12.5 fL Final     Hepatitis B Surface Ag 09/19/2019  Negative  Negative Final     Rubella Antibody, IgG 09/19/2019 Positive   Final     Treponema Antibody (Syphilis) 09/19/2019 Negative  Negative Final     HIV Antigen / Antibody 09/19/2019 Negative  Negative Final     Amphetamines 09/19/2019 Screen Negative  Screen Negative Final     Benzodiazepines 09/19/2019 Screen Negative  Screen Negative Final     Opiates 09/19/2019 Screen Negative  Screen Negative Final     Phencyclidine 09/19/2019 Screen Negative  Screen Negative Final     THC 09/19/2019 Screen Negative  Screen Negative Final     Barbiturates 09/19/2019 Screen Negative  Screen Negative Final     Cocaine Metabolite 09/19/2019 Screen Negative  Screen Negative Final     Methadone 09/19/2019 Screen Negative  Screen Negative Final     Oxycodone 09/19/2019 Screen Negative  Screen Negative Final     Creatinine, Urine 09/19/2019 78.4  mg/dL Final     Alcohol, Urine 09/19/2019 <10  None detected mg/dL Final    None Detected     Color, UA 09/19/2019 Yellow  Colorless, Yellow, Straw, Light Yellow Final     Clarity, UA 09/19/2019 Hazy* Clear Final     Glucose, UA 09/19/2019 Negative  Negative Final     Bilirubin, UA 09/19/2019 Negative  Negative Final     Ketones, UA 09/19/2019 25 mg/dL* Negative Final     Specific Gravity, UA 09/19/2019 1.005  1.001 - 1.030 Final     Blood, UA 09/19/2019 Negative  Negative Final     pH, UA 09/19/2019 7.0  4.5 - 8.0 Final     Protein, UA 09/19/2019 30 mg/dL* Negative mg/dL Final     Urobilinogen, UA 09/19/2019 8.0 E.U./dL* <2.0 E.U./dL, 2.0 E.U./dL Final     Nitrite, UA 09/19/2019 Positive* Negative Final     Leukocytes, UA 09/19/2019 Moderate* Negative Final     Bacteria, UA 09/19/2019 Many* None Seen hpf Final     RBC, UA 09/19/2019 0-2  None Seen, 0-2 hpf Final     WBC, UA 09/19/2019 10-25* None Seen, 0-5 hpf Final     Squam Epithel, UA 09/19/2019 * None Seen, 0-5 lpf Final     Mucus, UA 09/19/2019 Few* None Seen lpf Final     Yeast Result 09/19/2019 No yeast seen  No yeast seen  Final     Trichomonas 2019 No Trichomonas seen  No Trichomonas seen Final     Clue Cells, Wet Prep 2019 No Clue cells seen  No Clue cells seen Final     Culture 2019 >100,000 col/ml Escherichia coli*  Final   Admission on 2019, Discharged on 2019   Component Date Value Ref Range Status     Betke/Fetal Hgb Det 2019 0.0  <=0.1 % Final   Admission on 2019, Discharged on 2019   Component Date Value Ref Range Status     ABORh 2019 O POS   Final     Beta-hCG, Quantitative 2019 605* 0 - 4 mlU/mL Final     Color, UA 2019 Kelley* Colorless, Yellow, Straw, Light Yellow Final     Clarity, UA 2019 Hazy* Clear Final     Glucose, UA 2019 Negative  Negative Final     Bilirubin, UA 2019 Small* Negative Final     Ketones, UA 2019 Negative  Negative Final     Specific Gravity, UA 2019 1.030  1.001 - 1.030 Final     Blood, UA 2019 Negative  Negative Final     pH, UA 2019 6.0  4.5 - 8.0 Final     Protein, UA 2019 30 mg/dL* Negative mg/dL Final     Urobilinogen, UA 2019 2.0 E.U./dL  <2.0 E.U./dL, 2.0 E.U./dL Final     Nitrite, UA 2019 Negative  Negative Final     Leukocytes, UA 2019 Moderate* Negative Final     Bacteria, UA 2019 Few* None Seen hpf Final     RBC, UA 2019 0-2  None Seen, 0-2 hpf Final     WBC, UA 2019 5-10* None Seen, 0-5 hpf Final     Squam Epithel, UA 2019 >100* None Seen, 0-5 lpf Final     Mucus, UA 2019 Many* None Seen lpf Final     Culture 2019 Mixture of urogenital organisms   Final       Medical History:  Active Ambulatory (Non-Hospital) Problems    Diagnosis     Abdominal pain      labor in third trimester with  delivery, fetus 1     Past Medical History:   Diagnosis Date     BV (bacterial vaginosis)      Chlamydia      Trauma        Surgical History:   has no past surgical history on file.    Social History  Reviewed, and she   reports that she has never smoked. She has never used smokeless tobacco. She reports that she does not drink alcohol or use drugs.    Family History:    No Known Allergies    Medications Prior to Admission   Medication Sig Dispense Refill Last Dose     calcium, as carbonate, (TUMS) 200 mg calcium (500 mg) chewable tablet Chew 2 tablets 2 (two) times a day.   12/3/2019 at Unknown time     doxylamine (UNISOM) 25 mg tablet Take 1 tablet (25 mg total) by mouth at bedtime as needed for sleep. 90 tablet 0 More than a month at Unknown time     hydrOXYzine pamoate (VISTARIL) 50 MG capsule Take 1 capsule (50 mg total) by mouth 3 (three) times a day as needed (contractions). 30 capsule 0      prenatal vitamin iron-folic acid 27mg-0.8mg (PRENATAL S) 27 mg iron- 800 mcg Tab tablet Take 1 tablet by mouth daily.   Past Week at Unknown time

## 2021-06-04 NOTE — PROGRESS NOTES
Faculty Supervision of Residents    I have examined this patient on 12/11/2019 and the medical care has been evaluated and discussed with the resident.  The documentation has been reviewed.  I agree with the medical care provided and confirm the findings.     Contractions have slowed.  If cervix unchanged at 1700, home.  Trey Catalan

## 2021-06-04 NOTE — PROGRESS NOTES
Dr. Bravo inquires, updated , orders to check cervical exam at 1500. Pt. Up to tub and then ambulating. Ausculating FHT's intermittently.

## 2021-06-04 NOTE — DISCHARGE SUMMARY
"OB Discharge Summary      Date:  2019    Name:  Carolina Mcpherson  :  1995  MRN:  [id number]    Admission Date:  2019  Discharge Date:  2019    Principal Diagnosis:    Early Labor     Conditions complicating Pregnancy:   - Late to prenatal care   - Excessive weight gain in pregnancy   -  contractions  requiring terb and magnesium, but no cervical change. Received betamethasone x2  - Group B strep - POSITIVE     Condition at Discharge:  good    Summary of Hospitalization:     Carolina Mcpherson was admitted to L&D on 19 for early labor. She was having regular painful contractions every 3-5 minutes. On presentation her cervical exam was 3/50/-2. After recheck 2 hours later she had changed to 4/70/-2. However following this check contractions decreased in intensity and frequency and prior to discharge were 7-10 minutes apart and palpated mild. After monitoring for 6 hours had no further cervical change. Infant Cat I throughout time she was monitored. Given indications to return to maternity and plan to follow up in clinic tomorrow. Has Vistaril at home to take PRN.     Discharge exam:  Temp:  [97.4  F (36.3  C)-97.9  F (36.6  C)] 97.9  F (36.6  C)  Heart Rate:  [] 107  Resp:  [18-20] 20  BP: (108-127)/(56-70) 127/64  /64   Pulse (!) 107   Temp 97.9  F (36.6  C)   Resp 20   Ht 5' 5\" (1.651 m)   Wt (!) 235 lb (106.6 kg)   LMP 2019 (Exact Date)   SpO2 99%   BMI 39.11 kg/m    Afebrile, VSS, cervical exam 4/70/-2    Discharge Medications:      Medication List      Unreviewed discharge medications    calcium (as carbonate) 200 mg calcium (500 mg) chewable tablet  Dose:  2 tablet  Commonly known as:  TUMS  2 tablets, Oral, 2 times daily     doxylamine 25 mg tablet  Quantity:  90 tablet  Dose:  25 mg  Commonly known as:  UNISOM  25 mg, Oral, Bedtime PRN     hydrOXYzine pamoate 50 MG capsule  Quantity:  30 capsule  Dose:  50 mg  Commonly known as:  VISTARIL  50 mg, " Oral, 3 times daily PRN     prenatal vitamin iron-folic acid 27mg-0.8mg 27 mg iron- 800 mcg Tab tablet  Dose:  1 tablet  Commonly known as:  PRENATAL S  1 tablet, Oral, DAILY            Discharge Plan:   Follow up with Dr. Bravo tomorrow   Discussed indications to return to L&D for further evaluation   Vistaril as needed, previously prescribed        Physician:   Bhavya Bravo DO (PGY3)  [facility] Resident  Pager: [phone]    Precepted patient with Dr. Taran Catalan and Julio Mendez MD

## 2021-06-19 NOTE — LETTER
Letter by Hu Gomes MD at      Author: Hu Gomes MD Service: -- Author Type: --    Filed:  Encounter Date: 9/19/2019 Status: (Other)         September 19, 2019     Patient: Carolina Mcpherson   YOB: 1995   Date of Visit: 9/19/2019       To Whom It May Concern:    It is my medical opinion that Carolina Mcpherson should be excused from work duties on 9/19/19 for medical reasons.    If you have any questions or concerns, please don't hesitate to call.    Sincerely,        Electronically signed by Hu Gomes MD
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
## 2024-07-01 ENCOUNTER — OFFICE VISIT (OUTPATIENT)
Dept: FAMILY MEDICINE | Facility: CLINIC | Age: 29
End: 2024-07-01

## 2024-07-01 VITALS
SYSTOLIC BLOOD PRESSURE: 111 MMHG | WEIGHT: 242.4 LBS | OXYGEN SATURATION: 100 % | HEART RATE: 81 BPM | DIASTOLIC BLOOD PRESSURE: 77 MMHG | TEMPERATURE: 98.8 F | RESPIRATION RATE: 18 BRPM | BODY MASS INDEX: 39.42 KG/M2

## 2024-07-01 DIAGNOSIS — O26.851 SPOTTING COMPLICATING PREGNANCY IN FIRST TRIMESTER: Primary | ICD-10-CM

## 2024-07-01 LAB — HCG INTACT+B SERPL-ACNC: 3568 MIU/ML

## 2024-07-01 PROCEDURE — 84702 CHORIONIC GONADOTROPIN TEST: CPT | Performed by: PHYSICIAN ASSISTANT

## 2024-07-01 PROCEDURE — 36415 COLL VENOUS BLD VENIPUNCTURE: CPT | Performed by: PHYSICIAN ASSISTANT

## 2024-07-01 PROCEDURE — 99203 OFFICE O/P NEW LOW 30 MIN: CPT | Performed by: PHYSICIAN ASSISTANT

## 2024-07-01 NOTE — PROGRESS NOTES
Assessment & Plan     Spotting complicating pregnancy in first trimester  Discussed with patient increasing hCG levels consistent with pregnancy.  Her bleeding has stopped and she has no abdominal pain or cramping.  No indication for further evaluation at this time.  I have recommended she contact her OB tomorrow and let them know she has been having some first trimester bleeding and results of initial evaluation including her ultrasound done at [facility] as well as serial hCG levels.  Patient relays understanding.   - HCG quantitative pregnancy  - HCG quantitative pregnancy       CRIS [place] [facility] ANN MARIE Figueroa is a 29 year old female who presents to clinic today for the following health issues:  Chief Complaint   Patient presents with    Vaginal Bleeding     Started Thursday night (stopped yesterday), only when wiping, had a miscarriage in feb and wants to make sure, did have some mild cramping and pressure (off and on), 6wks pregnant, would like hcg checked       HPI  Patient is a 29-year-old female who presents to urgent care with concerns regarding first trimester bleeding.  She was seen yesterday at [facility].  An ultrasound was obtained confirming intrauterine products of conception with gestational age of 4 weeks 4 days but with hCG level of 594.  Her bleeding has resolved but has been very light with only small amount of blood noted with wiping.  There is no cramping and she has not passed any tissue.  She was told to follow-up with her primary care provider.  Patient reports she has an appointment at the end of the month for her 9-week first OB visit.  She has not contacted her primary to discuss her symptoms as of yet.  She presents today requesting hCG as she is quite concerned about if this represents early miscarriage versus early pregnancy.      Review of Systems  Constitutional, HEENT, cardiovascular, pulmonary, gi and  gu systems are negative, except as otherwise noted.      Objective    /77   Pulse 81   Temp 98.8  F (37.1  C) (Oral)   Resp 18   Wt 110 kg (242 lb 6.4 oz)   LMP  (LMP Unknown)   SpO2 100%   BMI 39.42 kg/m    Physical Exam   Patient is no acute distress and appears well.  Results for orders placed or performed in visit on 07/01/24   HCG quantitative pregnancy     Status: Abnormal   Result Value Ref Range    hCG Quantitative 3,568 (H) <5 mIU/mL
